# Patient Record
Sex: FEMALE | Race: WHITE | NOT HISPANIC OR LATINO | Employment: FULL TIME | URBAN - METROPOLITAN AREA
[De-identification: names, ages, dates, MRNs, and addresses within clinical notes are randomized per-mention and may not be internally consistent; named-entity substitution may affect disease eponyms.]

---

## 2018-05-11 ENCOUNTER — CONVERSION ENCOUNTER (OUTPATIENT)
Dept: MAMMOGRAPHY | Facility: HOSPITAL | Age: 48
End: 2018-05-11

## 2019-06-03 ENCOUNTER — HOSPITAL ENCOUNTER (OUTPATIENT)
Dept: LAB | Facility: HOSPITAL | Age: 49
Discharge: HOME | End: 2019-06-03
Attending: PSYCHIATRY & NEUROLOGY

## 2019-06-03 LAB
AMPHETAMINES UR QL SCN: POSITIVE
BARBITURATES UR QL SCN: NEGATIVE
BENZODIAZ UR QL SCN: NEGATIVE
CONV COCAINE, UR: NEGATIVE
METHADONE UR QL SCN: NEGATIVE
OPIATES TESTED UR SCN: NEGATIVE
OXYCODONE UR QL SCN: NEGATIVE
PCP UR QL: NEGATIVE
THC SERPLBLD CFM-MCNC: NEGATIVE NG/ML

## 2019-06-04 ENCOUNTER — HOSPITAL ENCOUNTER (OUTPATIENT)
Dept: SLEEP MEDICINE | Facility: HOSPITAL | Age: 49
Discharge: HOME | End: 2019-06-04
Attending: PSYCHIATRY & NEUROLOGY

## 2019-07-10 ENCOUNTER — OFFICE VISIT CONVERTED (OUTPATIENT)
Dept: CARDIOLOGY | Facility: CLINIC | Age: 49
End: 2019-07-10
Attending: INTERNAL MEDICINE

## 2019-10-11 ENCOUNTER — OFFICE VISIT CONVERTED (OUTPATIENT)
Dept: GASTROENTEROLOGY | Facility: CLINIC | Age: 49
End: 2019-10-11
Attending: PHYSICIAN ASSISTANT

## 2019-10-11 ENCOUNTER — CONVERSION ENCOUNTER (OUTPATIENT)
Dept: GASTROENTEROLOGY | Facility: CLINIC | Age: 49
End: 2019-10-11

## 2019-10-24 ENCOUNTER — HOSPITAL ENCOUNTER (OUTPATIENT)
Dept: GASTROENTEROLOGY | Facility: HOSPITAL | Age: 49
Setting detail: HOSPITAL OUTPATIENT SURGERY
Discharge: HOME | End: 2019-10-24
Attending: INTERNAL MEDICINE

## 2020-01-28 ENCOUNTER — HOSPITAL ENCOUNTER (OUTPATIENT)
Dept: SLEEP MEDICINE | Facility: HOSPITAL | Age: 50
Discharge: HOME | End: 2020-01-28
Attending: PSYCHIATRY & NEUROLOGY

## 2020-10-14 ENCOUNTER — HOSPITAL ENCOUNTER (OUTPATIENT)
Dept: SLEEP MEDICINE | Facility: HOSPITAL | Age: 50
Discharge: HOME | End: 2020-10-14
Attending: PSYCHIATRY & NEUROLOGY

## 2021-04-14 ENCOUNTER — HOSPITAL ENCOUNTER (OUTPATIENT)
Dept: SLEEP MEDICINE | Facility: HOSPITAL | Age: 51
Discharge: HOME | End: 2021-04-14
Attending: PSYCHIATRY & NEUROLOGY

## 2021-05-15 VITALS
HEART RATE: 69 BPM | WEIGHT: 155 LBS | SYSTOLIC BLOOD PRESSURE: 115 MMHG | RESPIRATION RATE: 16 BRPM | OXYGEN SATURATION: 100 % | HEIGHT: 66 IN | BODY MASS INDEX: 24.91 KG/M2 | DIASTOLIC BLOOD PRESSURE: 69 MMHG

## 2021-05-15 VITALS — DIASTOLIC BLOOD PRESSURE: 78 MMHG | SYSTOLIC BLOOD PRESSURE: 110 MMHG

## 2021-07-01 DIAGNOSIS — G47.11 IDIOPATHIC HYPERSOMNIA: Primary | ICD-10-CM

## 2021-07-01 RX ORDER — DEXTROAMPHETAMINE SACCHARATE, AMPHETAMINE ASPARTATE, DEXTROAMPHETAMINE SULFATE AND AMPHETAMINE SULFATE 5; 5; 5; 5 MG/1; MG/1; MG/1; MG/1
20 TABLET ORAL 3 TIMES DAILY
Qty: 90 TABLET | Refills: 0 | Status: SHIPPED | OUTPATIENT
Start: 2021-07-01 | End: 2021-08-05 | Stop reason: SDUPTHER

## 2021-08-05 DIAGNOSIS — G47.11 IDIOPATHIC HYPERSOMNIA: ICD-10-CM

## 2021-08-05 RX ORDER — DEXTROAMPHETAMINE SACCHARATE, AMPHETAMINE ASPARTATE, DEXTROAMPHETAMINE SULFATE AND AMPHETAMINE SULFATE 5; 5; 5; 5 MG/1; MG/1; MG/1; MG/1
20 TABLET ORAL 3 TIMES DAILY
Qty: 90 TABLET | Refills: 0 | Status: SHIPPED | OUTPATIENT
Start: 2021-08-05 | End: 2021-09-08 | Stop reason: SDUPTHER

## 2021-09-08 DIAGNOSIS — G47.11 IDIOPATHIC HYPERSOMNIA: ICD-10-CM

## 2021-09-08 RX ORDER — DEXTROAMPHETAMINE SACCHARATE, AMPHETAMINE ASPARTATE, DEXTROAMPHETAMINE SULFATE AND AMPHETAMINE SULFATE 5; 5; 5; 5 MG/1; MG/1; MG/1; MG/1
20 TABLET ORAL 3 TIMES DAILY
Qty: 90 TABLET | Refills: 0 | Status: SHIPPED | OUTPATIENT
Start: 2021-09-08 | End: 2021-10-12 | Stop reason: SDUPTHER

## 2021-10-12 ENCOUNTER — OFFICE VISIT (OUTPATIENT)
Dept: SLEEP MEDICINE | Facility: HOSPITAL | Age: 51
End: 2021-10-12

## 2021-10-12 VITALS
DIASTOLIC BLOOD PRESSURE: 68 MMHG | BODY MASS INDEX: 24.91 KG/M2 | HEIGHT: 66 IN | WEIGHT: 155 LBS | SYSTOLIC BLOOD PRESSURE: 120 MMHG | OXYGEN SATURATION: 100 % | HEART RATE: 83 BPM | TEMPERATURE: 96 F

## 2021-10-12 DIAGNOSIS — G47.11 IDIOPATHIC HYPERSOMNIA: ICD-10-CM

## 2021-10-12 PROCEDURE — G0463 HOSPITAL OUTPT CLINIC VISIT: HCPCS | Performed by: PSYCHIATRY & NEUROLOGY

## 2021-10-12 RX ORDER — DEXTROAMPHETAMINE SACCHARATE, AMPHETAMINE ASPARTATE, DEXTROAMPHETAMINE SULFATE AND AMPHETAMINE SULFATE 5; 5; 5; 5 MG/1; MG/1; MG/1; MG/1
20 TABLET ORAL 3 TIMES DAILY
Qty: 90 TABLET | Refills: 0 | Status: SHIPPED | OUTPATIENT
Start: 2021-10-12 | End: 2021-12-13 | Stop reason: SDUPTHER

## 2021-12-06 ENCOUNTER — HOSPITAL ENCOUNTER (OUTPATIENT)
Facility: HOSPITAL | Age: 51
Setting detail: OBSERVATION
Discharge: HOME | End: 2021-12-07
Attending: EMERGENCY MEDICINE | Admitting: STUDENT IN AN ORGANIZED HEALTH CARE EDUCATION/TRAINING PROGRAM

## 2021-12-06 ENCOUNTER — APPOINTMENT (OUTPATIENT)
Dept: GENERAL RADIOLOGY | Facility: HOSPITAL | Age: 51
End: 2021-12-06

## 2021-12-06 DIAGNOSIS — R07.9 CHEST PAIN, UNSPECIFIED TYPE: Primary | ICD-10-CM

## 2021-12-06 LAB
ALBUMIN SERPL-MCNC: 4.2 G/DL (ref 3.5–5.2)
ALBUMIN/GLOB SERPL: 1.3 G/DL
ALP SERPL-CCNC: 52 U/L (ref 39–117)
ALT SERPL W P-5'-P-CCNC: 13 U/L (ref 1–33)
ANION GAP SERPL CALCULATED.3IONS-SCNC: 8 MMOL/L (ref 5–15)
AST SERPL-CCNC: 20 U/L (ref 1–32)
BASOPHILS # BLD AUTO: 0.06 10*3/MM3 (ref 0–0.2)
BASOPHILS NFR BLD AUTO: 1.3 % (ref 0–1.5)
BILIRUB SERPL-MCNC: 0.2 MG/DL (ref 0–1.2)
BUN SERPL-MCNC: 14 MG/DL (ref 6–20)
BUN/CREAT SERPL: 19.7 (ref 7–25)
CALCIUM SPEC-SCNC: 9.5 MG/DL (ref 8.6–10.5)
CHLORIDE SERPL-SCNC: 101 MMOL/L (ref 98–107)
CK MB SERPL-CCNC: 1.74 NG/ML
CK SERPL-CCNC: 151 U/L (ref 20–180)
CO2 SERPL-SCNC: 28 MMOL/L (ref 22–29)
CREAT SERPL-MCNC: 0.71 MG/DL (ref 0.57–1)
DEPRECATED RDW RBC AUTO: 41.2 FL (ref 37–54)
EOSINOPHIL # BLD AUTO: 0.18 10*3/MM3 (ref 0–0.4)
EOSINOPHIL NFR BLD AUTO: 3.8 % (ref 0.3–6.2)
ERYTHROCYTE [DISTWIDTH] IN BLOOD BY AUTOMATED COUNT: 12.6 % (ref 12.3–15.4)
GFR SERPL CREATININE-BSD FRML MDRD: 86 ML/MIN/1.73
GLOBULIN UR ELPH-MCNC: 3.2 GM/DL
GLUCOSE SERPL-MCNC: 96 MG/DL (ref 65–99)
HCT VFR BLD AUTO: 40.5 % (ref 34–46.6)
HGB BLD-MCNC: 13.9 G/DL (ref 12–15.9)
HOLD SPECIMEN: NORMAL
IMM GRANULOCYTES # BLD AUTO: 0 10*3/MM3 (ref 0–0.05)
IMM GRANULOCYTES NFR BLD AUTO: 0 % (ref 0–0.5)
LIPASE SERPL-CCNC: 31 U/L (ref 13–60)
LYMPHOCYTES # BLD AUTO: 1.78 10*3/MM3 (ref 0.7–3.1)
LYMPHOCYTES NFR BLD AUTO: 38 % (ref 19.6–45.3)
MAGNESIUM SERPL-MCNC: 1.8 MG/DL (ref 1.6–2.6)
MCH RBC QN AUTO: 30.5 PG (ref 26.6–33)
MCHC RBC AUTO-ENTMCNC: 34.3 G/DL (ref 31.5–35.7)
MCV RBC AUTO: 89 FL (ref 79–97)
MONOCYTES # BLD AUTO: 0.44 10*3/MM3 (ref 0.1–0.9)
MONOCYTES NFR BLD AUTO: 9.4 % (ref 5–12)
NEUTROPHILS NFR BLD AUTO: 2.23 10*3/MM3 (ref 1.7–7)
NEUTROPHILS NFR BLD AUTO: 47.5 % (ref 42.7–76)
NRBC BLD AUTO-RTO: 0 /100 WBC (ref 0–0.2)
NT-PROBNP SERPL-MCNC: 51.1 PG/ML (ref 0–900)
PLATELET # BLD AUTO: 223 10*3/MM3 (ref 140–450)
PMV BLD AUTO: 11.1 FL (ref 6–12)
POTASSIUM SERPL-SCNC: 4.3 MMOL/L (ref 3.5–5.2)
PROT SERPL-MCNC: 7.4 G/DL (ref 6–8.5)
QT INTERVAL: 414 MS
QT INTERVAL: 445 MS
RBC # BLD AUTO: 4.55 10*6/MM3 (ref 3.77–5.28)
SODIUM SERPL-SCNC: 137 MMOL/L (ref 136–145)
TROPONIN I SERPL-MCNC: 0 NG/ML (ref 0–0.6)
TROPONIN I SERPL-MCNC: 0 NG/ML (ref 0–0.6)
TROPONIN T SERPL-MCNC: <0.01 NG/ML (ref 0–0.03)
TROPONIN T SERPL-MCNC: <0.01 NG/ML (ref 0–0.03)
WBC NRBC COR # BLD: 4.69 10*3/MM3 (ref 3.4–10.8)
WHOLE BLOOD HOLD SPECIMEN: NORMAL
WHOLE BLOOD HOLD SPECIMEN: NORMAL

## 2021-12-06 PROCEDURE — 82550 ASSAY OF CK (CPK): CPT

## 2021-12-06 PROCEDURE — 96372 THER/PROPH/DIAG INJ SC/IM: CPT

## 2021-12-06 PROCEDURE — 93005 ELECTROCARDIOGRAM TRACING: CPT

## 2021-12-06 PROCEDURE — 85025 COMPLETE CBC W/AUTO DIFF WBC: CPT

## 2021-12-06 PROCEDURE — 93010 ELECTROCARDIOGRAM REPORT: CPT | Performed by: INTERNAL MEDICINE

## 2021-12-06 PROCEDURE — G0378 HOSPITAL OBSERVATION PER HR: HCPCS

## 2021-12-06 PROCEDURE — 99220 PR INITIAL OBSERVATION CARE/DAY 70 MINUTES: CPT | Performed by: INTERNAL MEDICINE

## 2021-12-06 PROCEDURE — 84484 ASSAY OF TROPONIN QUANT: CPT | Performed by: INTERNAL MEDICINE

## 2021-12-06 PROCEDURE — 84484 ASSAY OF TROPONIN QUANT: CPT

## 2021-12-06 PROCEDURE — 71045 X-RAY EXAM CHEST 1 VIEW: CPT

## 2021-12-06 PROCEDURE — 83690 ASSAY OF LIPASE: CPT

## 2021-12-06 PROCEDURE — 80053 COMPREHEN METABOLIC PANEL: CPT

## 2021-12-06 PROCEDURE — 36415 COLL VENOUS BLD VENIPUNCTURE: CPT

## 2021-12-06 PROCEDURE — 83880 ASSAY OF NATRIURETIC PEPTIDE: CPT

## 2021-12-06 PROCEDURE — 82553 CREATINE MB FRACTION: CPT

## 2021-12-06 PROCEDURE — 99284 EMERGENCY DEPT VISIT MOD MDM: CPT

## 2021-12-06 PROCEDURE — 93005 ELECTROCARDIOGRAM TRACING: CPT | Performed by: EMERGENCY MEDICINE

## 2021-12-06 PROCEDURE — 93005 ELECTROCARDIOGRAM TRACING: CPT | Performed by: INTERNAL MEDICINE

## 2021-12-06 PROCEDURE — 83735 ASSAY OF MAGNESIUM: CPT

## 2021-12-06 RX ORDER — NITROGLYCERIN 0.4 MG/1
0.4 TABLET SUBLINGUAL
Status: DISCONTINUED | OUTPATIENT
Start: 2021-12-06 | End: 2021-12-07 | Stop reason: HOSPADM

## 2021-12-06 RX ORDER — MECLIZINE HYDROCHLORIDE 25 MG/1
25 TABLET ORAL 3 TIMES DAILY PRN
COMMUNITY

## 2021-12-06 RX ORDER — ESCITALOPRAM OXALATE 10 MG/1
20 TABLET ORAL DAILY
Status: DISCONTINUED | OUTPATIENT
Start: 2021-12-07 | End: 2021-12-07 | Stop reason: HOSPADM

## 2021-12-06 RX ORDER — ASPIRIN 81 MG/1
81 TABLET ORAL DAILY
Status: DISCONTINUED | OUTPATIENT
Start: 2021-12-07 | End: 2021-12-07 | Stop reason: HOSPADM

## 2021-12-06 RX ORDER — ESTRADIOL 0.03 MG/D
1 FILM, EXTENDED RELEASE TRANSDERMAL 2 TIMES WEEKLY
COMMUNITY

## 2021-12-06 RX ORDER — ESCITALOPRAM OXALATE 20 MG/1
20 TABLET ORAL DAILY
COMMUNITY

## 2021-12-06 RX ORDER — ASPIRIN 81 MG/1
324 TABLET, CHEWABLE ORAL ONCE
Status: COMPLETED | OUTPATIENT
Start: 2021-12-06 | End: 2021-12-06

## 2021-12-06 RX ORDER — ASPIRIN 81 MG/1
81 TABLET ORAL DAILY
COMMUNITY

## 2021-12-06 RX ORDER — SODIUM CHLORIDE 0.9 % (FLUSH) 0.9 %
10 SYRINGE (ML) INJECTION AS NEEDED
Status: DISCONTINUED | OUTPATIENT
Start: 2021-12-06 | End: 2021-12-07 | Stop reason: HOSPADM

## 2021-12-06 RX ORDER — SODIUM CHLORIDE 0.9 % (FLUSH) 0.9 %
10 SYRINGE (ML) INJECTION EVERY 12 HOURS SCHEDULED
Status: DISCONTINUED | OUTPATIENT
Start: 2021-12-06 | End: 2021-12-07 | Stop reason: HOSPADM

## 2021-12-06 RX ORDER — SODIUM CHLORIDE 0.9 % (FLUSH) 0.9 %
10 SYRINGE (ML) INJECTION AS NEEDED
Status: DISCONTINUED | OUTPATIENT
Start: 2021-12-06 | End: 2021-12-06

## 2021-12-06 RX ADMIN — ASPIRIN 324 MG: 81 TABLET, CHEWABLE ORAL at 14:55

## 2021-12-06 NOTE — H&P
[facility]   HOSPITALIST HISTORY AND PHYSICAL  Date: 2021   Patient Name: Terese Chun  : 1970  MRN: [id number]  Primary Care Physician:  Fabricio Vang MD  Date of admission: 2021    Subjective   Subjective     Chief Complaint:   Chest pain    HPI:    Terese Chun is a 51 y.o. female with a past medical history significant for depression, current vaping    Patient presents to the emergency department on 2021 with chest pain.  Patient describes a crushing or pressure-like sensation that is been on and off for the past 3 weeks.  Patient denies association with exertion.  Often this comes on at rest.  Patient had an episode similar to this back in 2019.  At that time she had a cardiac work-up, told it was unrevealing.  Patient was offered a cath at the time however decided not to pursue.  Patient was started on aspirin and metoprolol with improvement in her symptoms.  Patient has a strong family history for cardiac disease, currently vaping daily.  In the emergency department initial EKG and troponins were nonconcerning.  Patient will be admitted for chest pain rule out.  Emergency room physician contacted cardiology.  Hospitalist service was contacted for admission      Personal History     Past Medical History:  Depression  Current vaping    Past Surgical History:  Hysterectomy  Hemorrhoidectomy    Family History:   Several family members with heart disease    Social History:   Currently vapes daily  Minimal alcohol use  No illicit drug use    Home Medications:  amphetamine-dextroamphetamine, aspirin, escitalopram, estradiol, meclizine, and metoprolol tartrate    Allergies:  Allergies   Allergen Reactions   • Augmentin [Amoxicillin-Pot Clavulanate] Diarrhea   • Keflex [Cephalexin] Rash       Review of Systems   All systems were reviewed and negative except for: Chest pain, improved since presentation    Objective   Objective     Vitals:   Temp:  [98.3 °F (36.8 °C)]  98.3 °F (36.8 °C)  Heart Rate:  [52-71] 52  Resp:  [18] 18  BP: ()/(58-73) 102/66    Physical Exam    Constitutional: Awake, alert, no acute distress   Eyes: Pupils equal, sclerae anicteric, no conjunctival injection   HENT: NCAT, mucous membranes moist   Neck: Supple, no thyromegaly, no lymphadenopathy, trachea midline   Respiratory: Clear to auscultation bilaterally, nonlabored respirations    Cardiovascular: RRR, no murmurs, rubs, or gallops, palpable pedal pulses bilaterally   Gastrointestinal: Positive bowel sounds, soft, nontender, nondistended   Musculoskeletal: No bilateral ankle edema, no clubbing or cyanosis to extremities   Psychiatric: Appropriate affect, cooperative   Neurologic: Oriented x 3, strength symmetric in all extremities, Cranial Nerves grossly intact to confrontation, speech clear   Skin: No rashes     Result Review    Result Review:  I have personally reviewed the results from the time of this admission to 12/6/2021 17:04 EST and agree with these findings:  [x]  Laboratory  []  Microbiology  [x]  Radiology  [x]  EKG/Telemetry   [x]  Cardiology/Vascular   []  Pathology  [x]  Old records  []  Other:      Assessment/Plan   Assessment / Plan     Assessment/Plan:   Chest pain rule out  Depression  Ongoing use of vaping    Plan:  Patient will be admitted to the hospital under observation status   Cardiology has been consulted, appreciate assistance  Patient has been loaded with aspirin in the ED  Continue with daily aspirin  Continue to trend troponins and EKGs  Ordering patient's home metoprolol  Continuing her Lexapro  Starting patient on a cardiac diet  Nitro ordered for any additional chest pain      DVT prophylaxis:  Medical DVT prophylaxis orders are present.    CODE STATUS:    Code Status (Patient has no pulse and is not breathing): CPR (Attempt to Resuscitate)  Medical Interventions (Patient has pulse or is breathing): Full Support      Admission Status:  I believe this patient meets  observation status.    Electronically signed by Korey Solomon MD, 12/06/21, 3:13 PM EST.    WBC   Date Value Ref Range Status   12/06/2021 4.69 3.40 - 10.80 10*3/mm3 Final     RBC   Date Value Ref Range Status   12/06/2021 4.55 3.77 - 5.28 10*6/mm3 Final     Hemoglobin   Date Value Ref Range Status   12/06/2021 13.9 12.0 - 15.9 g/dL Final     Hematocrit   Date Value Ref Range Status   12/06/2021 40.5 34.0 - 46.6 % Final     MCV   Date Value Ref Range Status   12/06/2021 89.0 79.0 - 97.0 fL Final     MCH   Date Value Ref Range Status   12/06/2021 30.5 26.6 - 33.0 pg Final     MCHC   Date Value Ref Range Status   12/06/2021 34.3 31.5 - 35.7 g/dL Final     RDW   Date Value Ref Range Status   12/06/2021 12.6 12.3 - 15.4 % Final     RDW-SD   Date Value Ref Range Status   12/06/2021 41.2 37.0 - 54.0 fl Final     MPV   Date Value Ref Range Status   12/06/2021 11.1 6.0 - 12.0 fL Final     Platelets   Date Value Ref Range Status   12/06/2021 223 140 - 450 10*3/mm3 Final     Neutrophil %   Date Value Ref Range Status   12/06/2021 47.5 42.7 - 76.0 % Final     Lymphocyte %   Date Value Ref Range Status   12/06/2021 38.0 19.6 - 45.3 % Final     Monocyte %   Date Value Ref Range Status   12/06/2021 9.4 5.0 - 12.0 % Final     Eosinophil %   Date Value Ref Range Status   12/06/2021 3.8 0.3 - 6.2 % Final     Basophil %   Date Value Ref Range Status   12/06/2021 1.3 0.0 - 1.5 % Final     Immature Grans %   Date Value Ref Range Status   12/06/2021 0.0 0.0 - 0.5 % Final     Neutrophils, Absolute   Date Value Ref Range Status   12/06/2021 2.23 1.70 - 7.00 10*3/mm3 Final     Lymphocytes, Absolute   Date Value Ref Range Status   12/06/2021 1.78 0.70 - 3.10 10*3/mm3 Final     Monocytes, Absolute   Date Value Ref Range Status   12/06/2021 0.44 0.10 - 0.90 10*3/mm3 Final     Eosinophils, Absolute   Date Value Ref Range Status   12/06/2021 0.18 0.00 - 0.40 10*3/mm3 Final     Basophils, Absolute   Date Value Ref Range Status   12/06/2021 0.06  0.00 - 0.20 10*3/mm3 Final     Immature Grans, Absolute   Date Value Ref Range Status   12/06/2021 0.00 0.00 - 0.05 10*3/mm3 Final     nRBC   Date Value Ref Range Status   12/06/2021 0.0 0.0 - 0.2 /100 WBC Final       Lab Results   Component Value Date    GLUCOSE 96 12/06/2021    BUN 14 12/06/2021    CREATININE 0.71 12/06/2021    EGFRIFNONA 86 12/06/2021    BCR 19.7 12/06/2021    K 4.3 12/06/2021    CO2 28.0 12/06/2021    CALCIUM 9.5 12/06/2021    ALBUMIN 4.20 12/06/2021    LABIL2 1.3 (L) 07/02/2019    AST 20 12/06/2021    ALT 13 12/06/2021       XR Chest 1 View    Result Date: 12/6/2021  PROCEDURE: XR CHEST 1 VW  COMPARISON: [facility], CR, CHEST AP/PA 1 VIEW, 7/02/2019, 13:41.  INDICATIONS: Chest Pain triage protocol  FINDINGS:  There is no pneumothorax, pleural effusion or focal airspace consolidation. The heart size and pulmonary vasculature appear within normal limits. There are no acute osseous abnormalities.  CONCLUSION: No acute cardiopulmonary abnormality.       ASHLEY WILLSON MD       Electronically Signed and Approved By: ASHLEY WILLSON MD on 12/06/2021 at 11:29

## 2021-12-06 NOTE — ED PROVIDER NOTES
Time: 12:18 PM EST  Arrived by: private car  Chief Complaint: chest pain  History provided by: patient  History is limited by: N/A     History of Present Illness:  Patient is a 51 y.o. year old female that presents to the emergency department with chest pain described as pressure with radiation to the left arm.  The patient reports that she had this episode 3 weeks ago.  She states that it came back again yesterday and once again today.  She reports that the chest pain has been at rest.  She denies exacerbation through exertion.  Patient denies shortness of breath.  Patient has no cough hemoptysis.  Patient reports nausea with no emesis.  Patient denies leg pain and swelling.  Patient denies abdominal pain.      Chest Pain  Pain location:  L chest  Pain quality: pressure    Pain radiates to:  L arm  Pain severity:  No pain  Onset quality:  Sudden  Duration:  3 weeks  Timing:  Constant  Progression:  Worsening  Relieved by:  Nothing  Worsened by:  Nothing  Associated symptoms: no abdominal pain, no cough, no fever, no headache, no nausea, no palpitations, no shortness of breath and no vomiting        Similar Symptoms Previously: yes  Recently seen: no      Patient Care Team  Primary Care Provider: Fabricio Vang MD    Past Medical History:     Allergies   Allergen Reactions   • Augmentin [Amoxicillin-Pot Clavulanate] Diarrhea   • Keflex [Cephalexin] Rash     No past medical history on file.  No past surgical history on file.  No family history on file.    Home Medications:  Prior to Admission medications    Medication Sig Start Date End Date Taking? Authorizing Provider   amphetamine-dextroamphetamine (Adderall) 20 MG tablet Take 1 tablet by mouth 3 (Three) Times a Day. 10/12/21   Shari Mathur MD        Social History:   Social History     Tobacco Use   • Smoking status: Not on file   • Smokeless tobacco: Not on file   Substance Use Topics   • Alcohol use: Not on file   • Drug use: Not on file     Recent  "travel: no     Review of Systems:  Review of Systems   Constitutional: Negative for chills and fever.   HENT: Negative for congestion, rhinorrhea and sore throat.    Eyes: Negative for pain and visual disturbance.   Respiratory: Negative for apnea, cough, chest tightness and shortness of breath.    Cardiovascular: Negative for chest pain and palpitations.   Gastrointestinal: Negative for abdominal pain, diarrhea, nausea and vomiting.   Genitourinary: Negative for difficulty urinating and dysuria.   Musculoskeletal: Negative for joint swelling and myalgias.   Skin: Negative for color change.   Neurological: Negative for seizures and headaches.   Psychiatric/Behavioral: Negative.    All other systems reviewed and are negative.       Physical Exam:  BP 99/68   Pulse 55   Temp 98.3 °F (36.8 °C) (Oral)   Resp 18   Ht 165.1 cm (65\")   Wt 69.4 kg (153 lb)   SpO2 98%   BMI 25.46 kg/m²     Physical Exam  Vitals and nursing note reviewed.   Constitutional:       General: She is not in acute distress.     Appearance: Normal appearance. She is not toxic-appearing.   HENT:      Head: Normocephalic and atraumatic.      Jaw: There is normal jaw occlusion.   Eyes:      General: Lids are normal.      Extraocular Movements: Extraocular movements intact.      Conjunctiva/sclera: Conjunctivae normal.      Pupils: Pupils are equal, round, and reactive to light.   Cardiovascular:      Rate and Rhythm: Normal rate and regular rhythm.      Pulses: Normal pulses.      Heart sounds: Normal heart sounds.   Pulmonary:      Effort: Pulmonary effort is normal. No respiratory distress.      Breath sounds: Normal breath sounds. No wheezing or rhonchi.   Abdominal:      General: Abdomen is flat.      Palpations: Abdomen is soft.      Tenderness: There is no abdominal tenderness. There is no guarding or rebound.   Musculoskeletal:         General: Normal range of motion.      Cervical back: Normal range of motion and neck supple.      Right " lower leg: No edema.      Left lower leg: No edema.   Skin:     General: Skin is warm and dry.   Neurological:      Mental Status: She is alert and oriented to person, place, and time. Mental status is at baseline.   Psychiatric:         Mood and Affect: Mood normal.                Medications in the Emergency Department:  Medications   sodium chloride 0.9 % flush 10 mL (has no administration in time range)   aspirin chewable tablet 324 mg (324 mg Oral Given 12/6/21 1455)        Labs  Lab Results (last 24 hours)     Procedure Component Value Units Date/Time    POC Troponin I with Hold Tube [[id number]] Collected: 12/06/21 1055    Specimen: Blood Updated: 12/06/21 1126    Narrative:      The following orders were created for panel order POC Troponin I with Hold Tube.  Procedure                               Abnormality         Status                     ---------                               -----------         ------                     POC Troponin I[[id number]]                                                              HOLD Troponin-I Tube[[id number]]                             Final result                 Please view results for these tests on the individual orders.    CBC & Differential [[id number]]  (Normal) Collected: 12/06/21 1055    Specimen: Blood Updated: 12/06/21 1112    Narrative:      The following orders were created for panel order CBC & Differential.  Procedure                               Abnormality         Status                     ---------                               -----------         ------                     CBC Auto Differential[[id number]]        Normal              Final result                 Please view results for these tests on the individual orders.    Comprehensive Metabolic Panel [[id number]] Collected: 12/06/21 1055    Specimen: Blood Updated: 12/06/21 1138     Glucose 96 mg/dL      BUN 14 mg/dL      Creatinine 0.71 mg/dL      Sodium 137 mmol/L      Potassium 4.3 mmol/L       Chloride 101 mmol/L      CO2 28.0 mmol/L      Calcium 9.5 mg/dL      Total Protein 7.4 g/dL      Albumin 4.20 g/dL      ALT (SGPT) 13 U/L      AST (SGOT) 20 U/L      Alkaline Phosphatase 52 U/L      Total Bilirubin 0.2 mg/dL      eGFR Non African Amer 86 mL/min/1.73      Globulin 3.2 gm/dL      A/G Ratio 1.3 g/dL      BUN/Creatinine Ratio 19.7     Anion Gap 8.0 mmol/L     Narrative:      GFR Normal >60  Chronic Kidney Disease <60  Kidney Failure <15      Lipase [[id number]]  (Normal) Collected: 12/06/21 1055    Specimen: Blood Updated: 12/06/21 1138     Lipase 31 U/L     BNP [[id number]]  (Normal) Collected: 12/06/21 1055    Specimen: Blood Updated: 12/06/21 1133     proBNP 51.1 pg/mL     Narrative:      Among patients with dyspnea, NT-proBNP is highly sensitive for the detection of acute congestive heart failure. In addition NT-proBNP of <300 pg/ml effectively rules out acute congestive heart failure with 99% negative predictive value.    Results may be falsely decreased if patient taking Biotin.      Magnesium [[id number]]  (Normal) Collected: 12/06/21 1055    Specimen: Blood Updated: 12/06/21 1138     Magnesium 1.8 mg/dL     CK Total & CKMB [[id number]]  (Normal) Collected: 12/06/21 1055    Specimen: Blood Updated: 12/06/21 1138     CKMB 1.74 ng/mL      Creatine Kinase 151 U/L     Narrative:      CKMB results may be falsely decreased if patient taking Biotin.    CBC Auto Differential [[id number]]  (Normal) Collected: 12/06/21 1055    Specimen: Blood Updated: 12/06/21 1112     WBC 4.69 10*3/mm3      RBC 4.55 10*6/mm3      Hemoglobin 13.9 g/dL      Hematocrit 40.5 %      MCV 89.0 fL      MCH 30.5 pg      MCHC 34.3 g/dL      RDW 12.6 %      RDW-SD 41.2 fl      MPV 11.1 fL      Platelets 223 10*3/mm3      Neutrophil % 47.5 %      Lymphocyte % 38.0 %      Monocyte % 9.4 %      Eosinophil % 3.8 %      Basophil % 1.3 %      Immature Grans % 0.0 %      Neutrophils, Absolute 2.23 10*3/mm3      Lymphocytes, Absolute 1.78  10*3/mm3      Monocytes, Absolute 0.44 10*3/mm3      Eosinophils, Absolute 0.18 10*3/mm3      Basophils, Absolute 0.06 10*3/mm3      Immature Grans, Absolute 0.00 10*3/mm3      nRBC 0.0 /100 WBC     POC Troponin I [[id number]]  (Normal) Collected: 12/06/21 1102    Specimen: Blood Updated: 12/06/21 1126     Troponin I 0.00 ng/mL      Comment: Serial Number: [id number]:  [id number]              Imaging:  XR Chest 1 View    Result Date: 12/6/2021  PROCEDURE: XR CHEST 1 VW  COMPARISON: [facility], CR, CHEST AP/PA 1 VIEW, 7/02/2019, 13:41.  INDICATIONS: Chest Pain triage protocol  FINDINGS:  There is no pneumothorax, pleural effusion or focal airspace consolidation. The heart size and pulmonary vasculature appear within normal limits. There are no acute osseous abnormalities.  CONCLUSION: No acute cardiopulmonary abnormality.       ASHLEY WILLSON MD       Electronically Signed and Approved By: ASHLEY WILLSON MD on 12/06/2021 at 11:29               Procedures:  Procedures    Progress     EKG:    Rhythm: sinus  Rate: 65  Axis: normal   Intervals: RBBB  ST Segment: no elevations    EKG Comparison: unchanged    Interpreted by me                         Medical Decision Making:  MDM  Number of Diagnoses or Management Options  Chest pain, unspecified type  Diagnosis management comments: The patient´s CBC was reviewed and shows no abnormalities of critical concern. Of note, there is no anemia requiring a blood transfusion and the platelet count is acceptable.  The patient´s CMP was reviewed and shows no abnormalities of critical concern. Of note, the patient´s sodium and potassium are acceptable. The patient´s liver enzymes are unremarkable. The patient´s renal function (creatinine) is preserved. The patient has a normal anion gap.  Magnesium is 1.8.  Lipase is 31.  Troponin is negative.  Chest x-ray is negative for pneumonia.  Patient has a concerning story for chest pain.  Case was discussed with Dr. Griffith who  agrees to consult.  Patient admitted under the care of Dr. Taveras.       Amount and/or Complexity of Data Reviewed  Clinical lab tests: reviewed  Tests in the radiology section of CPT®: reviewed  Tests in the medicine section of CPT®: reviewed  Discussion of test results with the performing providers: yes  Discuss the patient with other providers: yes  Independent visualization of images, tracings, or specimens: yes    Risk of Complications, Morbidity, and/or Mortality  Presenting problems: moderate  Management options: moderate         Final diagnoses:   Chest pain, unspecified type        Disposition:  ED Disposition     ED Disposition Condition Comment    Decision to Admit                   Abi Beasley MD  12/06/21 [id number]

## 2021-12-07 ENCOUNTER — APPOINTMENT (OUTPATIENT)
Dept: NUCLEAR MEDICINE | Facility: HOSPITAL | Age: 51
End: 2021-12-07

## 2021-12-07 VITALS
OXYGEN SATURATION: 95 % | TEMPERATURE: 97.2 F | SYSTOLIC BLOOD PRESSURE: 89 MMHG | WEIGHT: 152.78 LBS | HEIGHT: 65 IN | BODY MASS INDEX: 25.45 KG/M2 | RESPIRATION RATE: 16 BRPM | DIASTOLIC BLOOD PRESSURE: 41 MMHG | HEART RATE: 67 BPM

## 2021-12-07 PROBLEM — Z82.49 FAMILY HISTORY OF EARLY CAD: Status: ACTIVE | Noted: 2021-12-07

## 2021-12-07 PROBLEM — F17.219 CIGARETTE NICOTINE DEPENDENCE WITH NICOTINE-INDUCED DISORDER: Status: ACTIVE | Noted: 2021-12-07

## 2021-12-07 LAB
ANION GAP SERPL CALCULATED.3IONS-SCNC: 8.7 MMOL/L (ref 5–15)
BH CV IMMEDIATE POST RECOVERY TECH DATA SYMPTOMS: NORMAL
BH CV IMMEDIATE POST TECH DATA BLOOD PRESSURE: NORMAL MMHG
BH CV IMMEDIATE POST TECH DATA HEART RATE: 110 BPM
BH CV IMMEDIATE POST TECH DATA OXYGEN SATS: 98 %
BH CV REST NUCLEAR ISOTOPE DOSE: 8.6 MCI
BH CV SIX MINUTE RECOVERY TECH DATA BLOOD PRESSURE: NORMAL
BH CV SIX MINUTE RECOVERY TECH DATA HEART RATE: 86 BPM
BH CV SIX MINUTE RECOVERY TECH DATA OXYGEN SATURATION: 97 %
BH CV STRESS BP STAGE 1: NORMAL
BH CV STRESS BP STAGE 2: NORMAL
BH CV STRESS BP STAGE 3: NORMAL
BH CV STRESS DURATION MIN STAGE 1: 3
BH CV STRESS DURATION MIN STAGE 2: 3
BH CV STRESS DURATION MIN STAGE 3: 3
BH CV STRESS DURATION SEC STAGE 1: 0
BH CV STRESS DURATION SEC STAGE 2: 0
BH CV STRESS DURATION SEC STAGE 3: 0
BH CV STRESS GRADE STAGE 1: 10
BH CV STRESS GRADE STAGE 2: 12
BH CV STRESS GRADE STAGE 3: 14
BH CV STRESS HR STAGE 1: 102
BH CV STRESS HR STAGE 2: 115
BH CV STRESS HR STAGE 3: 150
BH CV STRESS METS STAGE 1: 5
BH CV STRESS METS STAGE 2: 7.5
BH CV STRESS METS STAGE 3: 10
BH CV STRESS NUCLEAR ISOTOPE DOSE: 34.3 MCI
BH CV STRESS O2 STAGE 1: 95
BH CV STRESS O2 STAGE 2: 96
BH CV STRESS O2 STAGE 3: 96
BH CV STRESS PROTOCOL 1: NORMAL
BH CV STRESS RECOVERY BP: NORMAL MMHG
BH CV STRESS RECOVERY HR: 86 BPM
BH CV STRESS RECOVERY O2: 98 %
BH CV STRESS SPEED STAGE 1: 1.7
BH CV STRESS SPEED STAGE 2: 2.5
BH CV STRESS SPEED STAGE 3: 3.4
BH CV STRESS STAGE 1: 1
BH CV STRESS STAGE 2: 2
BH CV STRESS STAGE 3: 3
BH CV THREE MINUTE POST TECH DATA BLOOD PRESSURE: NORMAL MMHG
BH CV THREE MINUTE POST TECH DATA HEART RATE: 92 BPM
BH CV THREE MINUTE POST TECH DATA OXYGEN SATURATION: 98 %
BUN SERPL-MCNC: 16 MG/DL (ref 6–20)
BUN/CREAT SERPL: 17.6 (ref 7–25)
CALCIUM SPEC-SCNC: 9 MG/DL (ref 8.6–10.5)
CHLORIDE SERPL-SCNC: 102 MMOL/L (ref 98–107)
CHOLEST SERPL-MCNC: 202 MG/DL (ref 0–200)
CO2 SERPL-SCNC: 26.3 MMOL/L (ref 22–29)
CREAT SERPL-MCNC: 0.91 MG/DL (ref 0.57–1)
DEPRECATED RDW RBC AUTO: 41.4 FL (ref 37–54)
ERYTHROCYTE [DISTWIDTH] IN BLOOD BY AUTOMATED COUNT: 12.6 % (ref 12.3–15.4)
GFR SERPL CREATININE-BSD FRML MDRD: 65 ML/MIN/1.73
GLUCOSE SERPL-MCNC: 100 MG/DL (ref 65–99)
HBA1C MFR BLD: 5.2 % (ref 4.8–5.6)
HCT VFR BLD AUTO: 39.7 % (ref 34–46.6)
HDLC SERPL-MCNC: 44 MG/DL (ref 40–60)
HGB BLD-MCNC: 13.4 G/DL (ref 12–15.9)
LDLC SERPL CALC-MCNC: 139 MG/DL (ref 0–100)
LDLC/HDLC SERPL: 3.11 {RATIO}
LV EF NUC BP: 68 %
MAGNESIUM SERPL-MCNC: 1.8 MG/DL (ref 1.6–2.6)
MAXIMAL PREDICTED HEART RATE: 167 BPM
MCH RBC QN AUTO: 30.5 PG (ref 26.6–33)
MCHC RBC AUTO-ENTMCNC: 33.8 G/DL (ref 31.5–35.7)
MCV RBC AUTO: 90.2 FL (ref 79–97)
PERCENT MAX PREDICTED HR: 89.82 %
PLATELET # BLD AUTO: 188 10*3/MM3 (ref 140–450)
PMV BLD AUTO: 11.5 FL (ref 6–12)
POTASSIUM SERPL-SCNC: 4.3 MMOL/L (ref 3.5–5.2)
QT INTERVAL: 426 MS
QT INTERVAL: 431 MS
QT INTERVAL: 474 MS
RBC # BLD AUTO: 4.4 10*6/MM3 (ref 3.77–5.28)
SODIUM SERPL-SCNC: 137 MMOL/L (ref 136–145)
STRESS BASELINE BP: NORMAL MMHG
STRESS BASELINE HR: 61 BPM
STRESS O2 SAT REST: 97 %
STRESS PERCENT HR: 106 %
STRESS POST ESTIMATED WORKLOAD: 10.6 METS
STRESS POST EXERCISE DUR MIN: 9 MIN
STRESS POST EXERCISE DUR SEC: 59 SEC
STRESS POST O2 SAT PEAK: 96 %
STRESS POST PEAK BP: NORMAL MMHG
STRESS POST PEAK HR: 150 BPM
STRESS TARGET HR: 142 BPM
TRIGL SERPL-MCNC: 106 MG/DL (ref 0–150)
TROPONIN T SERPL-MCNC: <0.01 NG/ML (ref 0–0.03)
VLDLC SERPL-MCNC: 19 MG/DL (ref 5–40)
WBC NRBC COR # BLD: 5.91 10*3/MM3 (ref 3.4–10.8)

## 2021-12-07 PROCEDURE — 80048 BASIC METABOLIC PNL TOTAL CA: CPT | Performed by: INTERNAL MEDICINE

## 2021-12-07 PROCEDURE — G0378 HOSPITAL OBSERVATION PER HR: HCPCS

## 2021-12-07 PROCEDURE — 85027 COMPLETE CBC AUTOMATED: CPT | Performed by: INTERNAL MEDICINE

## 2021-12-07 PROCEDURE — 83036 HEMOGLOBIN GLYCOSYLATED A1C: CPT | Performed by: INTERNAL MEDICINE

## 2021-12-07 PROCEDURE — 78452 HT MUSCLE IMAGE SPECT MULT: CPT | Performed by: INTERNAL MEDICINE

## 2021-12-07 PROCEDURE — 99214 OFFICE O/P EST MOD 30 MIN: CPT | Performed by: INTERNAL MEDICINE

## 2021-12-07 PROCEDURE — 93005 ELECTROCARDIOGRAM TRACING: CPT | Performed by: INTERNAL MEDICINE

## 2021-12-07 PROCEDURE — 84484 ASSAY OF TROPONIN QUANT: CPT | Performed by: INTERNAL MEDICINE

## 2021-12-07 PROCEDURE — 93018 CV STRESS TEST I&R ONLY: CPT | Performed by: INTERNAL MEDICINE

## 2021-12-07 PROCEDURE — 83735 ASSAY OF MAGNESIUM: CPT | Performed by: INTERNAL MEDICINE

## 2021-12-07 PROCEDURE — A9502 TC99M TETROFOSMIN: HCPCS | Performed by: STUDENT IN AN ORGANIZED HEALTH CARE EDUCATION/TRAINING PROGRAM

## 2021-12-07 PROCEDURE — 0 TECHNETIUM TETROFOSMIN KIT: Performed by: STUDENT IN AN ORGANIZED HEALTH CARE EDUCATION/TRAINING PROGRAM

## 2021-12-07 PROCEDURE — 80061 LIPID PANEL: CPT | Performed by: INTERNAL MEDICINE

## 2021-12-07 PROCEDURE — 99217 PR OBSERVATION CARE DISCHARGE MANAGEMENT: CPT | Performed by: STUDENT IN AN ORGANIZED HEALTH CARE EDUCATION/TRAINING PROGRAM

## 2021-12-07 PROCEDURE — 93017 CV STRESS TEST TRACING ONLY: CPT

## 2021-12-07 PROCEDURE — 25010000002 ENOXAPARIN PER 10 MG: Performed by: INTERNAL MEDICINE

## 2021-12-07 PROCEDURE — 93010 ELECTROCARDIOGRAM REPORT: CPT | Performed by: INTERNAL MEDICINE

## 2021-12-07 PROCEDURE — 78452 HT MUSCLE IMAGE SPECT MULT: CPT

## 2021-12-07 PROCEDURE — 93016 CV STRESS TEST SUPVJ ONLY: CPT | Performed by: NURSE PRACTITIONER

## 2021-12-07 RX ORDER — METOPROLOL SUCCINATE 25 MG/1
25 TABLET, EXTENDED RELEASE ORAL NIGHTLY
Qty: 30 TABLET | Refills: 0 | Status: SHIPPED | OUTPATIENT
Start: 2021-12-07 | End: 2022-01-17 | Stop reason: SDUPTHER

## 2021-12-07 RX ORDER — NITROGLYCERIN 40 MG/1
1 PATCH TRANSDERMAL DAILY
Qty: 30 PATCH | Refills: 0 | Status: SHIPPED | OUTPATIENT
Start: 2021-12-08 | End: 2022-01-17 | Stop reason: SINTOL

## 2021-12-07 RX ORDER — METOPROLOL SUCCINATE 25 MG/1
25 TABLET, EXTENDED RELEASE ORAL NIGHTLY
Status: DISCONTINUED | OUTPATIENT
Start: 2021-12-07 | End: 2021-12-07 | Stop reason: HOSPADM

## 2021-12-07 RX ORDER — NITROGLYCERIN 40 MG/1
1 PATCH TRANSDERMAL DAILY
Status: DISCONTINUED | OUTPATIENT
Start: 2021-12-07 | End: 2021-12-07 | Stop reason: HOSPADM

## 2021-12-07 RX ADMIN — ASPIRIN 81 MG: 81 TABLET, COATED ORAL at 09:17

## 2021-12-07 RX ADMIN — TETROFOSMIN 1 DOSE: 1.38 INJECTION, POWDER, LYOPHILIZED, FOR SOLUTION INTRAVENOUS at 09:31

## 2021-12-07 RX ADMIN — ESCITALOPRAM OXALATE 20 MG: 10 TABLET ORAL at 14:35

## 2021-12-07 RX ADMIN — NITROGLYCERIN 1 PATCH: 0.2 PATCH TRANSDERMAL at 15:34

## 2021-12-07 RX ADMIN — ENOXAPARIN SODIUM 40 MG: 40 INJECTION SUBCUTANEOUS at 09:17

## 2021-12-07 RX ADMIN — TETROFOSMIN 1 DOSE: 1.38 INJECTION, POWDER, LYOPHILIZED, FOR SOLUTION INTRAVENOUS at 10:55

## 2021-12-07 RX ADMIN — SODIUM CHLORIDE, PRESERVATIVE FREE 10 ML: 5 INJECTION INTRAVENOUS at 09:17

## 2021-12-07 NOTE — PLAN OF CARE
Goal Outcome Evaluation:  Plan of Care Reviewed With: patient        Progress: improving  Outcome Summary: Patient had a stress test today that was negative. Started on nitro patch. Patient is getting D/C'ed home with follow up appts.

## 2021-12-07 NOTE — PROGRESS NOTES
[facility]   Hospitalist Progress Note  Date: 2021  Patient Name: Terese Chun  : 1970  MRN: [id number]  Date of admission: 2021      Subjective   Subjective     Chief Complaint: Chest pain    Summary: 51-year-old female presented with chief complaint of chest pain subsequently being evaluated for cardiac ischemia.  Cardiology is following and patient is undergoing nuclear stress test today.    Interval Followup: No events overnight.  Patient currently n.p.o. for nuclear stress test today.  She does complain of intermittent chest pain.  She did not require any nitroglycerin.  She is saturating well on room air.  Patient's blood pressure has been soft, however this appears to be chronic for her.  She is currently asymptomatic.  She denies any nausea, episodes emesis, fever, chills.    Review of Systems  All systems reviewed and are negative except as above in HPI.    Objective   Objective     Vitals:   Temp:  [97.7 °F (36.5 °C)-98.3 °F (36.8 °C)] 97.7 °F (36.5 °C)  Heart Rate:  [52-76] 56  Resp:  [16-18] 16  BP: ()/(43-89) 96/44  Physical Exam    Constitutional: Awake, alert, no acute distress   Eyes: Pupils equal, sclerae anicteric, no conjunctival injection   HENT: NCAT, mucous membranes moist   Neck: Supple, no thyromegaly, no lymphadenopathy, trachea midline   Respiratory: Clear to auscultation bilaterally, nonlabored respirations    Cardiovascular: RRR, no murmurs, rubs, or gallops, palpable pedal pulses bilaterally   Gastrointestinal: Positive bowel sounds, soft, nontender, nondistended   Musculoskeletal: No bilateral ankle edema, no clubbing or cyanosis to extremities   Psychiatric: Appropriate affect, cooperative   Neurologic: Answers questions appropriately, follows commands, no focal deficits   Skin: No rashes     Result Review    Result Review:  I have personally reviewed the results from the time of this admission to 2021 08:51 EST and agree with these  findings:  [x]  Laboratory  [x]  Microbiology  [x]  Radiology  [x]  EKG/Telemetry   []  Cardiology/Vascular   []  Pathology  [x]  Old records  []  Other:    Assessment/Plan   Assessment / Plan     Assessment:  Atypical chest pain concerning for underlying cardiac ischemia  Depression  History of vape use  Positive family history of early onset coronary artery disease  Current nicotine dependence    Plan:  Cardiology assistance appreciated this patient, plan for nuclear stress test  Serial cardiac enzymes negative  Continue to monitor on telemetry  Continue keep n.p.o., okay to restart diet post procedure  Continue to  on the importance of tobacco cessation going forward  Continue Lexapro, currently no mood disturbances  Lipid panel, A1c reviewed  Lovenox for DVT prophylaxis  Labs reviewed, records reviewed, image reviewed, vital signs reviewed, medications reviewed  Further recommendations pending clinical course    Discussed plan with RN.    DVT prophylaxis:  Medical DVT prophylaxis orders are present.    CODE STATUS:   Code Status (Patient has no pulse and is not breathing): CPR (Attempt to Resuscitate)  Medical Interventions (Patient has pulse or is breathing): Full Support        Electronically signed by Fabricio Tidwell DO, 12/07/21, 8:51 AM EST.

## 2021-12-07 NOTE — CASE MANAGEMENT/SOCIAL WORK
Discharge Planning Assessment   Greg     Patient Name: Terese Chun  MRN: [id number]  Today's Date: 12/7/2021    Admit Date: 12/6/2021     Discharge Needs Assessment     Row Name 12/07/21 1506       Living Environment    Primary Care Provided by self    Family Caregiver if Needed none    Quality of Family Relationships involved; helpful; supportive    Able to Return to Prior Arrangements yes       Resource/Environmental Concerns    Resource/Environmental Concerns none    Transportation Concerns car, none       Transition Planning    Patient/Family Anticipates Transition to home with family    Patient/Family Anticipated Services at Transition none    Transportation Anticipated car, drives self       Discharge Needs Assessment    Readmission Within the Last 30 Days no previous admission in last 30 days    Equipment Currently Used at Home none    Concerns to be Addressed no discharge needs identified; denies needs/concerns at this time    Anticipated Changes Related to Illness none    Equipment Needed After Discharge none               Discharge Plan     Row Name 12/07/21 1507       Plan    Plan Pt to follow up with Dr Vasquez with in 2 weeks. Pt will need a work note upon d/c.    Final Discharge Disposition Code 01 - home or self-care              Continued Care and Services - Admitted Since 12/6/2021    Coordination has not been started for this encounter.          Demographic Summary     Row Name 12/07/21 1505       General Information    Admission Type inpatient    Arrived From emergency department    Referral Source admission list    Preferred Language English     Used During This Interaction no               Functional Status     Row Name 12/07/21 1505       Functional Status    Usual Activity Tolerance excellent    Current Activity Tolerance excellent       Functional Status, IADL    Medications independent    Meal Preparation independent    Housekeeping independent    Laundry independent     Shopping independent       Mental Status    General Appearance WDL WDL       Mental Status Summary    Recent Changes in Mental Status/Cognitive Functioning no changes       Employment/    Employment Status employed full-time    Shift Worked first shift    Current or Previous Occupation healthcare    Employment/ Comments SS at [employer]               Psychosocial    No documentation.                Abuse/Neglect    No documentation.                Legal     Row Name 12/07/21 1506       Financial/Legal    Source of Income salary/wages       Legal    Criminal Activity/Legal Involvement none               Substance Abuse    No documentation.                Patient Forms    No documentation.                   Vera Gilliland RN

## 2021-12-07 NOTE — PLAN OF CARE
Goal Outcome Evaluation:      No change in patient status this shift. BP remains soft, which patient states is baseline. Continues to have intermittent chest pain, 5/10 at worst, described as pressure that lasts 2-3 minutes, resolves on own. NPO since midnight. Continue to monitor.

## 2021-12-07 NOTE — CONSULTS
[facility]   CONSULTATION  Patient Name: Terese Chun  : 1970  MRN: [id number]  Primary Care Physician:  Fabricio Vang MD  Date of admission: 2021    Subjective   Subjective     Chief Complaint: Chest pain    History of Present Illness: Ms. Chun is a 51 years old female who came in with chest pain that had been going well for the last few days.  The pain is left-sided sharp in character last 1 or 2 minutes and sometimes can be rare for many hours.  Its not severe moderate intensity does not cause her to be short of breath or diaphoretic.  It gets worse with stress.  She has had this pain in the past and had a treadmill stress test few years ago she was negative for ischemia.  However she has very strong family history of premature atherosclerosis and hence she is being admitted to rule out MI and do the appropriate studies.    Review of Systems   Constitutional: Negative for fatigue and fever.   HENT: Negative for hearing loss and trouble swallowing.    Eyes: Negative for visual disturbance.   Respiratory: Positive for chest tightness. Negative for cough, shortness of breath and wheezing.    Cardiovascular: Positive for chest pain. Negative for palpitations and leg swelling.   Gastrointestinal: Negative for abdominal distention, abdominal pain, nausea and vomiting.   Genitourinary: Negative for dysuria.   Musculoskeletal: Negative for myalgias.   Skin: Negative for rash.   Neurological: Negative for dizziness, syncope, light-headedness and headaches.      .negative for PND and negative for orthopnea.    Personal History       Past Medical History:   Diagnosis Date   • Hypertension        Past Surgical History:   Procedure Laterality Date   • HYSTERECTOMY         Family History: Strongly positive for premature atherosclerosis    Social History:  reports that she has quit smoking. Her smoking use included cigarettes. She has never used smokeless tobacco. She reports current alcohol  use. She reports that she does not use drugs.    Home Medications:  amphetamine-dextroamphetamine, aspirin, escitalopram, estradiol, meclizine, and metoprolol tartrate    Allergies:  Allergies   Allergen Reactions   • Augmentin [Amoxicillin-Pot Clavulanate] Diarrhea   • Keflex [Cephalexin] Rash       Objective    Objective     Vitals:   Temp:  [97.7 °F (36.5 °C)-98.3 °F (36.8 °C)] 97.7 °F (36.5 °C)  Heart Rate:  [52-76] 56  Resp:  [16-18] 16  BP: ()/(43-89) 96/44    Physical Exam      Result Review    Result Review:  I have personally reviewed the results from the time of this admission to 12/7/2021 09:05 EST and agree with these findings:  [x]  Laboratory  []  Microbiology  [x]  Radiology  [x]  EKG/Telemetry   []  Cardiology/Vascular   []  Pathology  []  Old records  []  Other:    WBC   Date Value Ref Range Status   12/07/2021 5.91 3.40 - 10.80 10*3/mm3 Final     RBC   Date Value Ref Range Status   12/07/2021 4.40 3.77 - 5.28 10*6/mm3 Final     Hemoglobin   Date Value Ref Range Status   12/07/2021 13.4 12.0 - 15.9 g/dL Final     Hematocrit   Date Value Ref Range Status   12/07/2021 39.7 34.0 - 46.6 % Final     MCV   Date Value Ref Range Status   12/07/2021 90.2 79.0 - 97.0 fL Final     MCH   Date Value Ref Range Status   12/07/2021 30.5 26.6 - 33.0 pg Final     MCHC   Date Value Ref Range Status   12/07/2021 33.8 31.5 - 35.7 g/dL Final     RDW   Date Value Ref Range Status   12/07/2021 12.6 12.3 - 15.4 % Final     RDW-SD   Date Value Ref Range Status   12/07/2021 41.4 37.0 - 54.0 fl Final     MPV   Date Value Ref Range Status   12/07/2021 11.5 6.0 - 12.0 fL Final     Platelets   Date Value Ref Range Status   12/07/2021 188 140 - 450 10*3/mm3 Final     Neutrophil %   Date Value Ref Range Status   12/06/2021 47.5 42.7 - 76.0 % Final     Lymphocyte %   Date Value Ref Range Status   12/06/2021 38.0 19.6 - 45.3 % Final     Monocyte %   Date Value Ref Range Status   12/06/2021 9.4 5.0 - 12.0 % Final     Eosinophil  %   Date Value Ref Range Status   12/06/2021 3.8 0.3 - 6.2 % Final     Basophil %   Date Value Ref Range Status   12/06/2021 1.3 0.0 - 1.5 % Final     Immature Grans %   Date Value Ref Range Status   12/06/2021 0.0 0.0 - 0.5 % Final     Neutrophils, Absolute   Date Value Ref Range Status   12/06/2021 2.23 1.70 - 7.00 10*3/mm3 Final     Lymphocytes, Absolute   Date Value Ref Range Status   12/06/2021 1.78 0.70 - 3.10 10*3/mm3 Final     Monocytes, Absolute   Date Value Ref Range Status   12/06/2021 0.44 0.10 - 0.90 10*3/mm3 Final     Eosinophils, Absolute   Date Value Ref Range Status   12/06/2021 0.18 0.00 - 0.40 10*3/mm3 Final     Basophils, Absolute   Date Value Ref Range Status   12/06/2021 0.06 0.00 - 0.20 10*3/mm3 Final     Immature Grans, Absolute   Date Value Ref Range Status   12/06/2021 0.00 0.00 - 0.05 10*3/mm3 Final     nRBC   Date Value Ref Range Status   12/06/2021 0.0 0.0 - 0.2 /100 WBC Final      Basic Metabolic Panel    Sodium Sodium   Date Value Ref Range Status   12/07/2021 137 136 - 145 mmol/L Final   12/06/2021 137 136 - 145 mmol/L Final      Potassium Potassium   Date Value Ref Range Status   12/07/2021 4.3 3.5 - 5.2 mmol/L Final     Comment:     Slight hemolysis detected by analyzer. Results may be affected.   12/06/2021 4.3 3.5 - 5.2 mmol/L Final      Chloride Chloride   Date Value Ref Range Status   12/07/2021 102 98 - 107 mmol/L Final   12/06/2021 101 98 - 107 mmol/L Final      Bicarbonate No results found for: PLASMABICARB   BUN BUN   Date Value Ref Range Status   12/07/2021 16 6 - 20 mg/dL Final   12/06/2021 14 6 - 20 mg/dL Final      Creatinine Creatinine   Date Value Ref Range Status   12/07/2021 0.91 0.57 - 1.00 mg/dL Final   12/06/2021 0.71 0.57 - 1.00 mg/dL Final      Calcium Calcium   Date Value Ref Range Status   12/07/2021 9.0 8.6 - 10.5 mg/dL Final   12/06/2021 9.5 8.6 - 10.5 mg/dL Final      Glucose      No components found for: GLUCOSE.*  Lab Results   Component Value Date     GLUCOSE 100 (H) 12/07/2021    BUN 16 12/07/2021    CREATININE 0.91 12/07/2021    EGFRIFNONA 65 12/07/2021    BCR 17.6 12/07/2021    K 4.3 12/07/2021    CO2 26.3 12/07/2021    CALCIUM 9.0 12/07/2021    ALBUMIN 4.20 12/06/2021    LABIL2 1.3 (L) 07/02/2019    AST 20 12/06/2021    ALT 13 12/06/2021      CARDIAC LABS:      Lab 12/07/21  0516 12/06/21  1953 12/06/21  1558 12/06/21  1055   PROBNP  --   --   --  51.1   TROPONIN T <0.010 <0.010 <0.010  --       TSH Results:       CPK    Common Labsle 12/6/21   Creatine Kinase 151            No results found for this or any previous visit.   No results found for: COVID19   EKG: normal EKG, normal sinus rhythm, unchanged from previous tracings, sinus bradycardia, incomplete right bundle branch block.  No change compared to previous EKG.   chest X-ray   CT-scan of the chest              Most notable findings include: Negative cardiac enzymes and unchanged EKG with atypical chest pain    Assessment/Plan   Assessment / Plan     Brief Patient Summary:  Terese Chun is a 51 y.o. female who admitted with atypical chest pain.  Risk factors include smoking history and strong family history for premature atherosclerosis    Active Hospital Problems:  Active Hospital Problems    Diagnosis    • Family history of early CAD    • Cigarette nicotine dependence with nicotine-induced disorder    • Chest pain      Plan: Proceed with nuclear stress test.  Smoking cessation Counseling was performed.  Patient is agreeable to stopping smoking      DVT prophylaxis:  Medical DVT prophylaxis orders are present.    CODE STATUS:    Code Status (Patient has no pulse and is not breathing): CPR (Attempt to Resuscitate)  Medical Interventions (Patient has pulse or is breathing): Full Support    Admission Status:  I believe this patient meets observation status.    Electronically signed by Jadiel Vasquez MD, 12/07/21, 8:57 AM EST.

## 2021-12-07 NOTE — DISCHARGE SUMMARY
[facility]         HOSPITALIST  DISCHARGE SUMMARY    Patient Name: Terese Chun  : 1970  MRN: [id number]    Date of Admission: 2021  Date of Discharge: 2021  Primary Care Physician: Fabricio Vang MD    Consults     Date and Time Order Name Status Description    2021  4:45 PM Inpatient Cardiology Consult            Active and Resolved Hospital Problems:  Active Hospital Problems    Diagnosis POA   • Family history of early CAD [Z82.49] Not Applicable   • Cigarette nicotine dependence with nicotine-induced disorder [F17.219] Unknown   • Chest pain [R07.9] Yes      Resolved Hospital Problems   No resolved problems to display.       Hospital Course      Hospital Course:  Terese Chun is a 51 y.o. female who presented with chief complaint of chest pain. Patient was kept n.p.o. and cardiology was consulted. It was recommended that patient undergo nuclear stress test. Patient ultimately had nuclear stress test that was read as normal myocardial perfusion study without inducible ischemia. Her ejection fraction was noted be estimated at 60%. Patient had her metoprolol titrates changed to metoprolol succinate and she was also given nitroglycerin patch to wear daily. Patient will follow up with cardiology as scheduled. She will follow-up with her PCP in 1 week. At the time of discharge patient had no chest pain, chest pressure, palpitations, fever, chills. She was discharged in stable condition.        DISCHARGE Follow Up Recommendations for labs and diagnostics: PCP 1 week. Cardiology as scheduled.      Day of Discharge     Vital Signs:  Temp:  [97.2 °F (36.2 °C)-97.9 °F (36.6 °C)] 97.2 °F (36.2 °C)  Heart Rate:  [53-76] 67  Resp:  [16] 16  BP: ()/(41-89) 89/41  Physical Exam:   Constitutional: Alert, awake, no acute distress  HEENT:  PERRLA, EOMI; No Scleral icterus  Neck:  Supple; No Mass, No Lymphadenopathy  Cardiovascular:  No Rubs, No Edema, No  JVD  Respiratory: No respiratory distress, unlabored breathing, saturating well on room air  Abdomen:  Normal BS all 4 Quadrants; No Guarding, No Tenderness  Musculoskeletal:  Pulses Positive all 4 Ext; No Cyanosis, No Edema  Neurological:  Alert+Ox3, Mental status WNL; No Dysarthria  Skin:  No Rash, No Cellulitis, No Erythema      Discharge Details        Discharge Medications      New Medications      Instructions Start Date   metoprolol succinate XL 25 MG 24 hr tablet  Commonly known as: TOPROL-XL   25 mg, Oral, Nightly      nitroglycerin 0.2 MG/HR patch  Commonly known as: NITRODUR   1 patch, Transdermal, Daily   Start Date: December 8, 2021        Continue These Medications      Instructions Start Date   amphetamine-dextroamphetamine 20 MG tablet  Commonly known as: Adderall   20 mg, Oral, 3 Times Daily      aspirin 81 MG EC tablet   81 mg, Oral, Daily      escitalopram 20 MG tablet  Commonly known as: LEXAPRO   20 mg, Oral, Daily      estradiol 0.025 MG/24HR patch  Commonly known as: VIVELLE-DOT   1 patch, Transdermal, 2 Times Weekly      meclizine 25 MG tablet  Commonly known as: ANTIVERT   25 mg, Oral, 3 Times Daily PRN         Stop These Medications    metoprolol tartrate 25 MG tablet  Commonly known as: LOPRESSOR            Allergies   Allergen Reactions   • Augmentin [Amoxicillin-Pot Clavulanate] Diarrhea   • Keflex [Cephalexin] Rash       Discharge Disposition:  Home or Self Care    Diet:  Hospital:  Diet Order   Procedures   • Diet Regular; Cardiac       Discharge Activity:       CODE STATUS:  Code Status and Medical Interventions:   Ordered at: 12/06/21 1703     Code Status (Patient has no pulse and is not breathing):    CPR (Attempt to Resuscitate)     Medical Interventions (Patient has pulse or is breathing):    Full Support         Future Appointments   Date Time Provider Department Center   1/13/2022 11:45 AM Jadiel Vasquez MD [facility] [place] [facility]   4/12/2022  2:30 PM Shari Mathur MD [facility] SLEEP CT  [facility]           Pertinent  and/or Most Recent Results     PROCEDURES:   XR Chest 1 View    Result Date: 12/6/2021  Narrative: PROCEDURE: XR CHEST 1 VW  COMPARISON: [facility], CR, CHEST AP/PA 1 VIEW, 7/02/2019, 13:41.  INDICATIONS: Chest Pain triage protocol  FINDINGS:  There is no pneumothorax, pleural effusion or focal airspace consolidation. The heart size and pulmonary vasculature appear within normal limits. There are no acute osseous abnormalities.  CONCLUSION: No acute cardiopulmonary abnormality.       ASHLEY WILLSON MD       Electronically Signed and Approved By: ASHLEY WILLSON MD on 12/06/2021 at 11:29             Stress Test With Myocardial Perfusion One Day    Result Date: 12/7/2021  Narrative: · Left ventricular ejection fraction is normal. (Calculated EF = 68%). · Myocardial perfusion imaging indicates a normal myocardial perfusion study with no evidence of ischemia. · Impressions are consistent with a low risk study. · Findings consistent with a normal ECG stress test.          LAB RESULTS:      Lab 12/07/21  0517 12/06/21  1055   WBC 5.91 4.69   HEMOGLOBIN 13.4 13.9   HEMATOCRIT 39.7 40.5   PLATELETS 188 223   NEUTROS ABS  --  2.23   IMMATURE GRANS (ABS)  --  0.00   LYMPHS ABS  --  1.78   MONOS ABS  --  0.44   EOS ABS  --  0.18   MCV 90.2 89.0         Lab 12/07/21  0517 12/07/21  0516 12/06/21  1055   SODIUM  --  137 137   POTASSIUM  --  4.3 4.3   CHLORIDE  --  102 101   CO2  --  26.3 28.0   ANION GAP  --  8.7 8.0   BUN  --  16 14   CREATININE  --  0.91 0.71   GLUCOSE  --  100* 96   CALCIUM  --  9.0 9.5   MAGNESIUM  --  1.8 1.8   HEMOGLOBIN A1C 5.20  --   --          Lab 12/06/21  1055   TOTAL PROTEIN 7.4   ALBUMIN 4.20   GLOBULIN 3.2   ALT (SGPT) 13   AST (SGOT) 20   BILIRUBIN 0.2   ALK PHOS 52   LIPASE 31         Lab 12/07/21  0516 12/06/21  1953 12/06/21  1558 12/06/21  1055   PROBNP  --   --   --  51.1   TROPONIN T <0.010 <0.010 <0.010  --          Lab 12/07/21  0516   CHOLESTEROL  202*   LDL CHOL 139*   HDL CHOL 44   TRIGLYCERIDES 106             Brief Urine Lab Results     None        Microbiology Results (last 10 days)     ** No results found for the last 240 hours. **                           Labs Pending at Discharge:        Time spent on Discharge including face to face service:  31 minutes    Electronically signed by Fabricio Tidwell DO, 12/07/21, 5:48 PM EST.

## 2021-12-08 ENCOUNTER — READMISSION MANAGEMENT (OUTPATIENT)
Dept: CALL CENTER | Facility: HOSPITAL | Age: 51
End: 2021-12-08

## 2021-12-08 NOTE — OUTREACH NOTE
Prep Survey      Responses   [facility] patient discharged from? [facility]   Is LACE score < 7 ? Yes   Emergency Room discharge w/ pulse ox? No   Eligibility Not Eligible   What are the reasons patient is not eligible? Other   Does the patient have one of the following disease processes/diagnoses(primary or secondary)? Other   Prep survey completed? Yes          Gillian Leonard RN

## 2021-12-10 NOTE — PROGRESS NOTES
[facility]    SLEEP CLINIC FOLLOW UP PROGRESS NOTE.    Terese Chun  1970  51 y.o.  female      PCP: Fabricio Vang MD      Date of visit: 10/12/2021    Patient is a 51 years old female who is returning for follow-up visit  Reason for follow-up visit: Idiopathic hypersomnia and medication refill and restless leg syndrome    HPI:  1.  Idiopathic hypersomnia.  Diagnosed with idiopathic hypersomnia on 11/23/2011 following a multiple sleep latency test done because of  history of excessive daytime sleepiness.  She had an average sleep latency of 3 minutes and 6 seconds out of 5 naps.  Currently being treated with Adderall 20 mg, 1 tablet twice a day. She developed a motor tic associated with Adderall however, these have gradually improved.  At the present time, she is taking Adderall 20 mg, 1 tablet twice a day without much problems.  Some other medications  that have been tried for her daytime sleepiness include Ritalin which produced some mood changes (irritable and angry) and Nuvigil which produced worsening headaches.  To date, she has had the best response with Adderall.    Patient goes to bed at around 10 PM and wakes up at around 6:00 in the morning getting approximately 7 to 8 hours of sleep during the night.    2.  Restless leg syndrome.  This is currently stable.  He takes Sinemet 25/100 when needed.  Her symptoms are intermittent.    Mediactions and allergies are reviewed by me and documented in the encounter.     SOCIAL ( habits pertaining to sleep medicine)  History of smoking:Yes e-cigarette  History of alcohol use: 1-2 per week  Caffeine use: 1 cup    REVIEW OF SYSTEMS:   [place] Sleepiness Scale :Total score: 16   Nsaal congestion: No  Dry mouth/nose: No  Post nasal drip; yes  Acid reflux/Heartburn: No  Abd bloating: No  Morining headache: No  Anxiety: No  Depression: No  Continued to experience intermittent  difficulty getting her words out.  She also  developed a transient  "episode where in her arms and legs got numb and tingly  Physical Exam :  CONSTITUTIONAL:  Vitals:    10/12/21 1500   BP: 120/68   Pulse: 83   Temp: 96 °F (35.6 °C)   SpO2: 100%   Weight: 70.3 kg (155 lb)   Height: 167.6 cm (66\")    Body mass index is 25.02 kg/m².   Neck: No masses noted.  No carotid bruits  RESP SYSTEM: Breath sounds are normal, no wheezes or crackles  CARDIOVASULAR: Heart rate is regular without murmur.   Neuropsych: She is awake alert and oriented.  Responses are coherent and relevant.  Mood and affect appeared appropriate.         ASSESSMENT AND PLAN:  · Idiopathic hypersomnia, stable on Adderall  · Restless leg syndrome, intermittent improved with medication      She is going to continue taking Adderall 20 mg 1 tablet twice a day  She is going to continue Sinemet 25/100 1 to 2 tablets as needed for restless leg syndrome  · Return in about 6 months (around 4/12/2022). . Patient's questions were answered.      Shari Mathur MD  12/9/2021               "

## 2021-12-13 DIAGNOSIS — G47.11 IDIOPATHIC HYPERSOMNIA: ICD-10-CM

## 2021-12-14 RX ORDER — DEXTROAMPHETAMINE SACCHARATE, AMPHETAMINE ASPARTATE, DEXTROAMPHETAMINE SULFATE AND AMPHETAMINE SULFATE 5; 5; 5; 5 MG/1; MG/1; MG/1; MG/1
20 TABLET ORAL 3 TIMES DAILY
Qty: 90 TABLET | Refills: 0 | Status: SHIPPED | OUTPATIENT
Start: 2021-12-14 | End: 2022-01-18 | Stop reason: SDUPTHER

## 2022-01-17 ENCOUNTER — OFFICE VISIT (OUTPATIENT)
Dept: CARDIOLOGY | Facility: CLINIC | Age: 52
End: 2022-01-17

## 2022-01-17 VITALS
BODY MASS INDEX: 25.99 KG/M2 | HEIGHT: 65 IN | WEIGHT: 156 LBS | SYSTOLIC BLOOD PRESSURE: 128 MMHG | DIASTOLIC BLOOD PRESSURE: 80 MMHG | HEART RATE: 86 BPM

## 2022-01-17 DIAGNOSIS — F17.219 CIGARETTE NICOTINE DEPENDENCE WITH NICOTINE-INDUCED DISORDER: ICD-10-CM

## 2022-01-17 DIAGNOSIS — E78.5 HYPERLIPIDEMIA LDL GOAL <100: ICD-10-CM

## 2022-01-17 DIAGNOSIS — R07.89 OTHER CHEST PAIN: Primary | ICD-10-CM

## 2022-01-17 PROCEDURE — 99214 OFFICE O/P EST MOD 30 MIN: CPT | Performed by: NURSE PRACTITIONER

## 2022-01-17 RX ORDER — NITROGLYCERIN 0.4 MG/1
TABLET SUBLINGUAL
Qty: 100 TABLET | Refills: 11 | Status: SHIPPED | OUTPATIENT
Start: 2022-01-17

## 2022-01-17 RX ORDER — METOPROLOL SUCCINATE 25 MG/1
25 TABLET, EXTENDED RELEASE ORAL NIGHTLY
Qty: 90 TABLET | Refills: 3 | Status: SHIPPED | OUTPATIENT
Start: 2022-01-17

## 2022-01-17 RX ORDER — ATORVASTATIN CALCIUM 20 MG/1
20 TABLET, FILM COATED ORAL DAILY
Qty: 90 TABLET | Refills: 3 | Status: SHIPPED | OUTPATIENT
Start: 2022-01-17

## 2022-01-17 NOTE — ASSESSMENT & PLAN NOTE
Stable even without a nitro patch.  (Intolerant due to headaches ) We will give her nitro tabs to use on a as needed basis.  Instructed to call if her chest pains increase and will consider diagnostic cardiac cath.

## 2022-01-17 NOTE — ASSESSMENT & PLAN NOTE
Lipids slightly high and in with her risk factors she needs to be on a statin.  Patient agrees.  So we will start on atorvastatin 20 mg a day.  Check lipid CMP in 3 months

## 2022-01-17 NOTE — PROGRESS NOTES
Chief Complaint  Chest Pain and Early family h/o of CAD    Subjective            Terese Shabana Chun presents to [facility] CARDIOLOG[facility]  She is a 51-year-old white female recently hospitalized for chest pains.  Admits that she had been without her metoprolol for a time before her hospitalization.  Stress test was normal and she was started on a nitro patch.  States she took the Nitropatch for 2 days but the headaches are unbearable.  So she stopped it.  She is not any significant chest discomfort without the Nitropatch.  Does have palpitation described a fluttery in sensation.  They are nonsustained.  Has occasional lightheaded for Ménière's disease. Denies  shortness of breath,  swelling,  syncope, PND, and orthopnea.  Continues to vape and has no desire to stop at this time.  But she is contemplating stopping it when she leaves her current position and starts school.  She had the [employer] COVID-vaccine but she is not had a booster yet.              Past History:    Past Medical History:   Diagnosis Date   • Chest pain    • Hypertension    • Idiopathic hypersomnia    • Meniere disease         Family History: family history includes Arrhythmia in her father; Cancer in her father; Heart disease in her mother.     Social History: reports that she has quit smoking. Her smoking use included cigarettes. She has never used smokeless tobacco. She reports current alcohol use. She reports that she does not use drugs.    Allergies: Augmentin [amoxicillin-pot clavulanate] and Keflex [cephalexin]      Past Surgical History:   Procedure Laterality Date   • HEMORRHOIDECTOMY     • HYSTERECTOMY            Current Outpatient Medications:   •  amphetamine-dextroamphetamine (Adderall) 20 MG tablet, Take 1 tablet by mouth 3 (Three) Times a Day., Disp: 90 tablet, Rfl: 0  •  aspirin 81 MG EC tablet, Take 81 mg by mouth Daily., Disp: , Rfl:   •  escitalopram (LEXAPRO) 20 MG tablet, Take 20 mg by mouth Daily.,  "Disp: , Rfl:   •  estradiol (VIVELLE-DOT) 0.025 MG/24HR patch, Place 1 patch on the skin as directed by provider 2 (Two) Times a Week., Disp: , Rfl:   •  meclizine (ANTIVERT) 25 MG tablet, Take 25 mg by mouth 3 (Three) Times a Day As Needed for Dizziness or Nausea., Disp: , Rfl:   •  metoprolol succinate XL (TOPROL-XL) 25 MG 24 hr tablet, Take 1 tablet by mouth Every Night., Disp: 90 tablet, Rfl: 3  •  atorvastatin (LIPITOR) 20 MG tablet, Take 1 tablet by mouth Daily., Disp: 90 tablet, Rfl: 3  •  nitroglycerin (NITROSTAT) 0.4 MG SL tablet, 1 under the tongue as needed for angina, may repeat q5mins for up three doses, Disp: 100 tablet, Rfl: 11     Medications Discontinued During This Encounter   Medication Reason   • nitroglycerin (NITRODUR) 0.2 MG/HR patch Side effects   • metoprolol succinate XL (TOPROL-XL) 25 MG 24 hr tablet Reorder        Review of Systems   Constitutional: Negative for fatigue.   Respiratory: Negative for cough and shortness of breath.    Cardiovascular: Positive for chest pain and palpitations. Negative for leg swelling.   Neurological: Positive for dizziness and light-headedness (Has menier's also).   All other systems reviewed and are negative.       Objective     Physical Exam  Constitutional:       General: She is not in acute distress.     Appearance: Normal appearance. She is normal weight.   Neck:      Vascular: No carotid bruit.   Cardiovascular:      Rate and Rhythm: Normal rate and regular rhythm.      Heart sounds: Normal heart sounds.   Pulmonary:      Effort: Pulmonary effort is normal.      Breath sounds: Normal breath sounds.   Musculoskeletal:      Right lower leg: No edema.      Left lower leg: No edema.   Neurological:      Mental Status: She is alert.       /80   Pulse 86   Ht 165.1 cm (65\")   Wt 70.8 kg (156 lb)   BMI 25.96 kg/m²       Vitals:    01/17/22 1125   BP: 128/80   Pulse: 86       Result Review :         The following data was reviewed by: Milagro Baker" ASIYA Sanchez on 01/17/2022:      Lab Results   Component Value Date    PROBNP 51.1 12/06/2021     CMP    CMP 12/6/21 12/7/21   Glucose 96 100 (A)   BUN 14 16   Creatinine 0.71 0.91   eGFR Non African Am 86 65   Sodium 137 137   Potassium 4.3 4.3   Chloride 101 102   Calcium 9.5 9.0   Albumin 4.20    Total Bilirubin 0.2    Alkaline Phosphatase 52    AST (SGOT) 20    ALT (SGPT) 13    (A) Abnormal value       Comments are available for some flowsheets but are not being displayed.           CBC w/diff    CBC w/Diff 12/6/21 12/7/21   WBC 4.69 5.91   RBC 4.55 4.40   Hemoglobin 13.9 13.4   Hematocrit 40.5 39.7   MCV 89.0 90.2   MCH 30.5 30.5   MCHC 34.3 33.8   RDW 12.6 12.6   Platelets 223 188   Neutrophil Rel % 47.5    Immature Granulocyte Rel % 0.0    Lymphocyte Rel % 38.0    Monocyte Rel % 9.4    Eosinophil Rel % 3.8    Basophil Rel % 1.3             Lipid Panel    Lipid Panel 12/7/21   Total Cholesterol 202 (A)   Triglycerides 106   HDL Cholesterol 44   VLDL Cholesterol 19   LDL Cholesterol  139 (A)   LDL/HDL Ratio 3.11   (A) Abnormal value             Lab Results   Component Value Date    TSH 2.370 01/25/2019      Lab Results   Component Value Date    FREET4 1.4 01/25/2019      No results found for: DDIMERQUANT  Magnesium   Date Value Ref Range Status   12/07/2021 1.8 1.6 - 2.6 mg/dL Final      No results found for: DIGOXIN   A1C Last 3 Results    HGBA1C Last 3 Results 12/7/21   Hemoglobin A1C 5.20                    Assessment and Plan        Diagnoses and all orders for this visit:    1. Other chest pain (Primary)  Assessment & Plan:  Stable even without a nitro patch.  (Intolerant due to headaches ) We will give her nitro tabs to use on a as needed basis.  Instructed to call if her chest pains increase and will consider diagnostic cardiac cath.    Orders:  -     nitroglycerin (NITROSTAT) 0.4 MG SL tablet; 1 under the tongue as needed for angina, may repeat q5mins for up three doses  Dispense: 100 tablet; Refill:  11  -     metoprolol succinate XL (TOPROL-XL) 25 MG 24 hr tablet; Take 1 tablet by mouth Every Night.  Dispense: 90 tablet; Refill: 3    2. Hyperlipidemia LDL goal <100  Assessment & Plan:  Lipids slightly high and in with her risk factors she needs to be on a statin.  Patient agrees.  So we will start on atorvastatin 20 mg a day.  Check lipid CMP in 3 months    Orders:  -     atorvastatin (LIPITOR) 20 MG tablet; Take 1 tablet by mouth Daily.  Dispense: 90 tablet; Refill: 3  -     Lipid Panel; Future  -     Comprehensive Metabolic Panel; Future    3. Cigarette nicotine dependence with nicotine-induced disorder  Assessment & Plan:  Counseled regarding the risk of continued vaping.  She is not ready to stop at this time.  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  She understands the risk of continued use.    4. Encouraged to get a COVID-booster soon as possible          Follow Up     Return in about 6 months (around 7/17/2022) for with Dr. Vasquez.    Patient was given instructions and counseling regarding her condition or for health maintenance advice. Please see specific information pulled into the AVS if appropriate.       Olivia Sanchez, ASIYA  01/17/22 11:30 EST

## 2022-01-17 NOTE — ASSESSMENT & PLAN NOTE
Counseled regarding the risk of continued vaping.  She is not ready to stop at this time.  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  She understands the risk of continued use.

## 2022-01-18 DIAGNOSIS — G47.11 IDIOPATHIC HYPERSOMNIA: ICD-10-CM

## 2022-01-18 RX ORDER — DEXTROAMPHETAMINE SACCHARATE, AMPHETAMINE ASPARTATE, DEXTROAMPHETAMINE SULFATE AND AMPHETAMINE SULFATE 5; 5; 5; 5 MG/1; MG/1; MG/1; MG/1
20 TABLET ORAL 3 TIMES DAILY
Qty: 90 TABLET | Refills: 0 | Status: SHIPPED | OUTPATIENT
Start: 2022-01-18 | End: 2022-03-08 | Stop reason: SDUPTHER

## 2022-03-08 DIAGNOSIS — G47.11 IDIOPATHIC HYPERSOMNIA: ICD-10-CM

## 2022-03-09 RX ORDER — DEXTROAMPHETAMINE SACCHARATE, AMPHETAMINE ASPARTATE, DEXTROAMPHETAMINE SULFATE AND AMPHETAMINE SULFATE 5; 5; 5; 5 MG/1; MG/1; MG/1; MG/1
20 TABLET ORAL 3 TIMES DAILY
Qty: 90 TABLET | Refills: 0 | Status: SHIPPED | OUTPATIENT
Start: 2022-03-09 | End: 2022-04-27 | Stop reason: SDUPTHER

## 2022-04-27 ENCOUNTER — OFFICE VISIT (OUTPATIENT)
Dept: SLEEP MEDICINE | Facility: HOSPITAL | Age: 52
End: 2022-04-27

## 2022-04-27 VITALS
HEIGHT: 65 IN | BODY MASS INDEX: 27.32 KG/M2 | SYSTOLIC BLOOD PRESSURE: 101 MMHG | HEART RATE: 83 BPM | TEMPERATURE: 97 F | WEIGHT: 164 LBS | OXYGEN SATURATION: 99 % | DIASTOLIC BLOOD PRESSURE: 69 MMHG

## 2022-04-27 DIAGNOSIS — G47.33 OSA (OBSTRUCTIVE SLEEP APNEA): Primary | ICD-10-CM

## 2022-04-27 DIAGNOSIS — G47.11 IDIOPATHIC HYPERSOMNIA: ICD-10-CM

## 2022-04-27 PROCEDURE — G0463 HOSPITAL OUTPT CLINIC VISIT: HCPCS | Performed by: PSYCHIATRY & NEUROLOGY

## 2022-04-27 RX ORDER — DEXTROAMPHETAMINE SACCHARATE, AMPHETAMINE ASPARTATE, DEXTROAMPHETAMINE SULFATE AND AMPHETAMINE SULFATE 5; 5; 5; 5 MG/1; MG/1; MG/1; MG/1
20 TABLET ORAL 3 TIMES DAILY
Qty: 90 TABLET | Refills: 0 | Status: SHIPPED | OUTPATIENT
Start: 2022-04-27 | End: 2022-06-08 | Stop reason: SDUPTHER

## 2022-05-27 NOTE — PROGRESS NOTES
Chief Complaint  Terese hCun is a 52 y.o. female who is returning for a follow-up visit.  Reason for follow-up visit: Idiopathic Hypersomnia, Restless Leg Syndrome, and medication follow-up and refill    Subjective            History of Present Illness  1.  Idiopathic Hypersomnia  She was diagnosed with idiopathic hypersomnia on 11/23/2011 after she had a sleep study done and a multiple sleep latency test because of history of excessive daytime sleepiness.  Her average sleep latency was 3 minutes and 6 seconds.  She is currently being treated with Adderall 20 mg 1 tablet up to 3 times  a day.  She has developed some motor tic associated with the Adderall (repeatedly touching the tip of her tongue to the upper teeth) however, these have gradually improved.  At the present time, she continues to take Adderall without any difficulties.  Other medications which have been tried for her daytime sleepiness include Ritalin with some side effects (mood changes such as irritability and feeling angry) and Nuvigil which produced worsening headaches.    The patient reports that she gets about 6 to 7 hours of sleep during the night.  Current bedtime is from 11 PM to 7 AM.  She wakes up 2 times to go to the bathroom however, is able to go back to sleep without any difficulties.    2.  Restless Leg Syndrome  She is also taking Sinemet 25/100 for restless leg symptoms on a as needed basis since her symptoms are intermittent.  She has no present complaints regarding this.    Review of Systems    Interval Past Medical and Surgical History:  Noncontributory    Past Medical History:   Diagnosis Date   • Chest pain    • Hypertension    • Idiopathic hypersomnia    • Meniere disease        Past Surgical History:   Procedure Laterality Date   • HEMORRHOIDECTOMY     • HYSTERECTOMY           Current Outpatient Medications:   •  amphetamine-dextroamphetamine (Adderall) 20 MG tablet, Take 1 tablet by mouth 3 (Three) Times a Day., Disp:  90 tablet, Rfl: 0  •  aspirin 81 MG EC tablet, Take 81 mg by mouth Daily., Disp: , Rfl:   •  atorvastatin (LIPITOR) 20 MG tablet, Take 1 tablet by mouth Daily., Disp: 90 tablet, Rfl: 3  •  escitalopram (LEXAPRO) 20 MG tablet, Take 20 mg by mouth Daily., Disp: , Rfl:   •  estradiol (VIVELLE-DOT) 0.025 MG/24HR patch, Place 1 patch on the skin as directed by provider 2 (Two) Times a Week., Disp: , Rfl:   •  meclizine (ANTIVERT) 25 MG tablet, Take 25 mg by mouth 3 (Three) Times a Day As Needed for Dizziness or Nausea., Disp: , Rfl:   •  metoprolol succinate XL (TOPROL-XL) 25 MG 24 hr tablet, Take 1 tablet by mouth Every Night., Disp: 90 tablet, Rfl: 3  •  nitroglycerin (NITROSTAT) 0.4 MG SL tablet, 1 under the tongue as needed for angina, may repeat q5mins for up three doses, Disp: 100 tablet, Rfl: 11     Allergies   Allergen Reactions   • Augmentin [Amoxicillin-Pot Clavulanate] Diarrhea   • Keflex [Cephalexin] Rash       Family History   Problem Relation Age of Onset   • Heart disease Mother    • Arrhythmia Father    • Cancer Father         Social History     Social History Narrative   • Not on file   She works dayshift.  She does not smoke.  Alcohol intake 1 drink per week.  Caffeine intake 2 cups of coffee per day.        Objective    Physical Exam  Vitals reviewed.   Constitutional:       Appearance: Normal appearance.   HENT:      Head: Normocephalic and atraumatic.   Cardiovascular:      Rate and Rhythm: Regular rhythm.      Heart sounds: Normal heart sounds.   Pulmonary:      Effort: Pulmonary effort is normal.      Breath sounds: Normal breath sounds.   Neurological:      Mental Status: She is alert and oriented to person, place, and time.   Psychiatric:         Mood and Affect: Mood normal.         Behavior: Behavior normal.         Thought Content: Thought content normal.         Vital Signs:  Vitals:    04/27/22 1100   BP: 101/69   Pulse: 83   Temp: 97 °F (36.1 °C)   SpO2: 99%   Weight: 74.4 kg (164 lb)  "  Height: 165.1 cm (65\")     Body mass index is 27.29 kg/m².     Result Review :        Assessment and Plan      Idiopathic Hypersomnia  Restless Leg Syndrome    Plans: She is going to continue taking Adderall 20 mg 1 tablet three times  a day              She is going to continue taking Sinemet 25/100 at night as needed for restless leg symptoms.    Xywav briefly discussed as indicated for idiopathic hypersomnia.  Follow Up     Return in about 6 months (around 10/27/2022).  Patient was given instructions and counseling regarding her condition or for health maintenance advice.  "

## 2022-06-08 DIAGNOSIS — G47.11 IDIOPATHIC HYPERSOMNIA: ICD-10-CM

## 2022-06-08 RX ORDER — DEXTROAMPHETAMINE SACCHARATE, AMPHETAMINE ASPARTATE, DEXTROAMPHETAMINE SULFATE AND AMPHETAMINE SULFATE 5; 5; 5; 5 MG/1; MG/1; MG/1; MG/1
20 TABLET ORAL 3 TIMES DAILY
Qty: 90 TABLET | Refills: 0 | Status: SHIPPED | OUTPATIENT
Start: 2022-06-08 | End: 2022-07-18 | Stop reason: SDUPTHER

## 2022-07-18 DIAGNOSIS — G47.11 IDIOPATHIC HYPERSOMNIA: ICD-10-CM

## 2022-07-19 RX ORDER — DEXTROAMPHETAMINE SACCHARATE, AMPHETAMINE ASPARTATE, DEXTROAMPHETAMINE SULFATE AND AMPHETAMINE SULFATE 5; 5; 5; 5 MG/1; MG/1; MG/1; MG/1
20 TABLET ORAL 3 TIMES DAILY
Qty: 90 TABLET | Refills: 0 | Status: SHIPPED | OUTPATIENT
Start: 2022-07-19 | End: 2022-09-16 | Stop reason: SDUPTHER

## 2022-09-16 DIAGNOSIS — G47.11 IDIOPATHIC HYPERSOMNIA: ICD-10-CM

## 2022-09-16 RX ORDER — DEXTROAMPHETAMINE SACCHARATE, AMPHETAMINE ASPARTATE, DEXTROAMPHETAMINE SULFATE AND AMPHETAMINE SULFATE 5; 5; 5; 5 MG/1; MG/1; MG/1; MG/1
20 TABLET ORAL 3 TIMES DAILY
Qty: 90 TABLET | Refills: 0 | Status: SHIPPED | OUTPATIENT
Start: 2022-09-16 | End: 2022-10-27 | Stop reason: SDUPTHER

## 2022-10-27 ENCOUNTER — OFFICE VISIT (OUTPATIENT)
Dept: SLEEP MEDICINE | Facility: HOSPITAL | Age: 52
End: 2022-10-27

## 2022-10-27 VITALS
DIASTOLIC BLOOD PRESSURE: 73 MMHG | WEIGHT: 143.8 LBS | HEART RATE: 66 BPM | SYSTOLIC BLOOD PRESSURE: 117 MMHG | BODY MASS INDEX: 23.11 KG/M2 | HEIGHT: 66 IN

## 2022-10-27 DIAGNOSIS — G47.11 IDIOPATHIC HYPERSOMNIA: Primary | ICD-10-CM

## 2022-10-27 PROCEDURE — 99214 OFFICE O/P EST MOD 30 MIN: CPT | Performed by: FAMILY MEDICINE

## 2022-10-27 PROCEDURE — G0463 HOSPITAL OUTPT CLINIC VISIT: HCPCS

## 2022-10-27 RX ORDER — DEXTROAMPHETAMINE SACCHARATE, AMPHETAMINE ASPARTATE, DEXTROAMPHETAMINE SULFATE AND AMPHETAMINE SULFATE 5; 5; 5; 5 MG/1; MG/1; MG/1; MG/1
20 TABLET ORAL 3 TIMES DAILY
Qty: 90 TABLET | Refills: 0 | Status: SHIPPED | OUTPATIENT
Start: 2022-10-27 | End: 2022-12-09 | Stop reason: SDUPTHER

## 2022-10-27 NOTE — PROGRESS NOTES
Follow Up Sleep Disorders Center Note     Chief Complaint: Idiopathic hypersomnia    Primary Care Physician: Fabricio Vang MD    Terese Chun is a 52 y.o.female  was last seen at [facility] sleep lab: 4/27/2022.  New patient to me.  Presents today for follow-up on idiopathic hypersomnia.  Sleep study in 2011 MSLT showed mean sleep latency 3 minutes 6 seconds.  At last visit being treated with Adderall 20 mg up to 3 times a day.  Developed motor tic associated with Adderall however those gradually improved (repeatedly touches the tip of her tongue to the upper teeth).  At last visit was doing well without any difficulties.  Had tried Ritalin in the past had side effects of mood changes and also tried Nuvigil which produced worsening headaches.  Patient is also on Sinemet 25/100 mg for restless leg syndrome.  Last refill for Adderall was September 16, 2022.    Today reports bedtime 12 PM wake time 8 AM.  Adderall continues to control symptoms well.  Needs refill today.  Was seen in the ER about a year ago for chest pain follows with cardiology on metoprolol.  No issues since then.  Blood pressure and heart rate normal.      Current Medications:    Current Outpatient Medications:   •  amphetamine-dextroamphetamine (Adderall) 20 MG tablet, Take 1 tablet by mouth 3 (Three) Times a Day., Disp: 90 tablet, Rfl: 0  •  aspirin 81 MG EC tablet, Take 81 mg by mouth Daily., Disp: , Rfl:   •  atorvastatin (LIPITOR) 20 MG tablet, Take 1 tablet by mouth Daily., Disp: 90 tablet, Rfl: 3  •  escitalopram (LEXAPRO) 20 MG tablet, Take 20 mg by mouth Daily., Disp: , Rfl:   •  estradiol (VIVELLE-DOT) 0.025 MG/24HR patch, Place 1 patch on the skin as directed by provider 2 (Two) Times a Week., Disp: , Rfl:   •  meclizine (ANTIVERT) 25 MG tablet, Take 25 mg by mouth 3 (Three) Times a Day As Needed for Dizziness or Nausea., Disp: , Rfl:   •  metoprolol succinate XL (TOPROL-XL) 25 MG 24 hr tablet, Take 1 tablet by mouth Every Night.,  "Disp: 90 tablet, Rfl: 3  •  nitroglycerin (NITROSTAT) 0.4 MG SL tablet, 1 under the tongue as needed for angina, may repeat q5mins for up three doses, Disp: 100 tablet, Rfl: 11   also entered in Sleep Questionnaire    Patient  has a past medical history of Chest pain, Hypertension, Idiopathic hypersomnia, and Meniere disease.    Social History:    Social History     Socioeconomic History   • Marital status:    Tobacco Use   • Smoking status: Former     Types: Cigarettes   • Smokeless tobacco: Never   Vaping Use   • Vaping Use: Every day   • Substances: Nicotine   Substance and Sexual Activity   • Alcohol use: Yes     Comment: occasional   • Drug use: Never   • Sexual activity: Defer       Allergies:  Augmentin [amoxicillin-pot clavulanate] and Keflex [cephalexin]    Vital Signs:    Vitals:    10/27/22 1453   BP: 117/73   Pulse: 66   Weight: 65.2 kg (143 lb 12.8 oz)   Height: 167.6 cm (66\")     Body mass index is 23.21 kg/m².    REVIEW OF SYSTEMS.  Full review of systems available on the intake form which is scanned in the media tab.  The relevant positive are noted below  1. Daytime excessive sleepiness with [place] Sleepiness Scale : 12  2. Negative      Physical exam:  Vitals:    10/27/22 1453   BP: 117/73   Pulse: 66   Weight: 65.2 kg (143 lb 12.8 oz)   Height: 167.6 cm (66\")    Body mass index is 23.21 kg/m².    HEENT: Head is atraumatic, normocephalic  Eyes: pupils are round equal and reacting to light and accommodation, conjunctiva normal  RESPIRATORY SYSTEM: Regular respirations  CARDIOVASULAR SYSTEM: Regular rate  EXTREMITES: No cyanosis, clubbing  NEUROLOGICAL SYSTEM: Oriented x 3, no gross motor defects, gait normal    Impression:  1. Idiopathic hypersomnia        Continue Adderall 20 mg up to 3 times daily.  Can call in between for refills.  Return to clinic in 6 months for follow-up or sooner if needed.    Caution during activities that require prolonged concentration is strongly advised if " sleepiness returns.     Marylou Brown MD  Sleep Medicine  10/27/22  15:01 EDT

## 2022-12-09 DIAGNOSIS — G47.11 IDIOPATHIC HYPERSOMNIA: ICD-10-CM

## 2022-12-09 RX ORDER — DEXTROAMPHETAMINE SACCHARATE, AMPHETAMINE ASPARTATE, DEXTROAMPHETAMINE SULFATE AND AMPHETAMINE SULFATE 5; 5; 5; 5 MG/1; MG/1; MG/1; MG/1
20 TABLET ORAL 3 TIMES DAILY
Qty: 90 TABLET | Refills: 0 | Status: SHIPPED | OUTPATIENT
Start: 2022-12-09 | End: 2023-01-30 | Stop reason: SDUPTHER

## 2022-12-16 ENCOUNTER — LAB (OUTPATIENT)
Dept: LAB | Facility: HOSPITAL | Age: 52
End: 2022-12-16

## 2022-12-16 DIAGNOSIS — E78.5 HYPERLIPIDEMIA LDL GOAL <100: ICD-10-CM

## 2022-12-16 PROCEDURE — 80061 LIPID PANEL: CPT

## 2022-12-16 PROCEDURE — 36415 COLL VENOUS BLD VENIPUNCTURE: CPT

## 2022-12-16 PROCEDURE — 80053 COMPREHEN METABOLIC PANEL: CPT

## 2022-12-17 LAB
ALBUMIN SERPL-MCNC: 4.4 G/DL (ref 3.5–5.2)
ALBUMIN/GLOB SERPL: 1.9 G/DL
ALP SERPL-CCNC: 49 U/L (ref 39–117)
ALT SERPL W P-5'-P-CCNC: 11 U/L (ref 1–33)
ANION GAP SERPL CALCULATED.3IONS-SCNC: 9.1 MMOL/L (ref 5–15)
AST SERPL-CCNC: 23 U/L (ref 1–32)
BILIRUB SERPL-MCNC: 0.3 MG/DL (ref 0–1.2)
BUN SERPL-MCNC: 13 MG/DL (ref 6–20)
BUN/CREAT SERPL: 15.7 (ref 7–25)
CALCIUM SPEC-SCNC: 9 MG/DL (ref 8.6–10.5)
CHLORIDE SERPL-SCNC: 102 MMOL/L (ref 98–107)
CHOLEST SERPL-MCNC: 135 MG/DL (ref 0–200)
CO2 SERPL-SCNC: 27.9 MMOL/L (ref 22–29)
CREAT SERPL-MCNC: 0.83 MG/DL (ref 0.57–1)
EGFRCR SERPLBLD CKD-EPI 2021: 83.9 ML/MIN/1.73
GLOBULIN UR ELPH-MCNC: 2.3 GM/DL
GLUCOSE SERPL-MCNC: 91 MG/DL (ref 65–99)
HDLC SERPL-MCNC: 44 MG/DL (ref 40–60)
LDLC SERPL CALC-MCNC: 79 MG/DL (ref 0–100)
LDLC/HDLC SERPL: 1.83 {RATIO}
POTASSIUM SERPL-SCNC: 4.2 MMOL/L (ref 3.5–5.2)
PROT SERPL-MCNC: 6.7 G/DL (ref 6–8.5)
SODIUM SERPL-SCNC: 139 MMOL/L (ref 136–145)
TRIGL SERPL-MCNC: 53 MG/DL (ref 0–150)
VLDLC SERPL-MCNC: 12 MG/DL (ref 5–40)

## 2023-01-30 DIAGNOSIS — G47.11 IDIOPATHIC HYPERSOMNIA: ICD-10-CM

## 2023-01-31 RX ORDER — DEXTROAMPHETAMINE SACCHARATE, AMPHETAMINE ASPARTATE, DEXTROAMPHETAMINE SULFATE AND AMPHETAMINE SULFATE 5; 5; 5; 5 MG/1; MG/1; MG/1; MG/1
20 TABLET ORAL 3 TIMES DAILY
Qty: 90 TABLET | Refills: 0 | Status: SHIPPED | OUTPATIENT
Start: 2023-01-31 | End: 2023-03-09 | Stop reason: SDUPTHER

## 2023-03-08 DIAGNOSIS — G47.11 IDIOPATHIC HYPERSOMNIA: ICD-10-CM

## 2023-03-08 RX ORDER — DEXTROAMPHETAMINE SACCHARATE, AMPHETAMINE ASPARTATE, DEXTROAMPHETAMINE SULFATE AND AMPHETAMINE SULFATE 5; 5; 5; 5 MG/1; MG/1; MG/1; MG/1
20 TABLET ORAL 3 TIMES DAILY
Qty: 90 TABLET | Refills: 0 | Status: CANCELLED | OUTPATIENT
Start: 2023-03-08

## 2023-03-09 DIAGNOSIS — G47.11 IDIOPATHIC HYPERSOMNIA: ICD-10-CM

## 2023-03-09 RX ORDER — DEXTROAMPHETAMINE SACCHARATE, AMPHETAMINE ASPARTATE, DEXTROAMPHETAMINE SULFATE AND AMPHETAMINE SULFATE 5; 5; 5; 5 MG/1; MG/1; MG/1; MG/1
20 TABLET ORAL 3 TIMES DAILY
Qty: 90 TABLET | Refills: 0 | Status: SHIPPED | OUTPATIENT
Start: 2023-03-09

## 2023-04-26 ENCOUNTER — OFFICE VISIT (OUTPATIENT)
Dept: SLEEP MEDICINE | Facility: HOSPITAL | Age: 53
End: 2023-04-26
Payer: COMMERCIAL

## 2023-04-26 VITALS
HEART RATE: 62 BPM | DIASTOLIC BLOOD PRESSURE: 65 MMHG | SYSTOLIC BLOOD PRESSURE: 115 MMHG | BODY MASS INDEX: 23.48 KG/M2 | HEIGHT: 66 IN | WEIGHT: 146.1 LBS | OXYGEN SATURATION: 100 %

## 2023-04-26 DIAGNOSIS — G47.11 IDIOPATHIC HYPERSOMNIA: Primary | ICD-10-CM

## 2023-04-26 PROCEDURE — G0463 HOSPITAL OUTPT CLINIC VISIT: HCPCS

## 2023-04-26 RX ORDER — DEXTROAMPHETAMINE SACCHARATE, AMPHETAMINE ASPARTATE, DEXTROAMPHETAMINE SULFATE AND AMPHETAMINE SULFATE 5; 5; 5; 5 MG/1; MG/1; MG/1; MG/1
20 TABLET ORAL 3 TIMES DAILY
Qty: 90 TABLET | Refills: 0 | Status: SHIPPED | OUTPATIENT
Start: 2023-04-26

## 2023-04-26 NOTE — PROGRESS NOTES
"  [address] [address]  Phone: [phone]  Fax: [phone]      SLEEP CLINIC FOLLOW UP PROGRESS NOTE.    Terese Chun  1970  53 y.o.  female      PCP: Fabricio Vang MD      Date of visit: 4/26/2023    Chief Complaint   Patient presents with   • Daytime Sleepiness       HPI:  This is a 53 y.o. years old patient is here for the management of idiopathic hypersomnia.  Previously she used to see Dr. Mathur then was followed by Dr. Brown.  She is on Adderall 20 mg 3 times a day.  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    Bedtime between 10 and 11 PM  Wake time between 6 and 7 AM  Number of times awakenings at night 1        Mediactions and allergies are reviewed by me and documented in the encounter.      Manuel report checked on PDMP and documented, no disturbance seen.      SOCIAL ( habits pertaining to sleep medicine)  History of smoking:Yes e-cigarette  History of alcohol use: <1 per week  Caffeine use: 2     REVIEW OF SYSTEMS:   [place] Sleepiness Scale :Total score: 13   Morining headache:No   Anxiety:No   Depression:No    PHYSICAL EXAMINATION:  CONSTITUTIONAL:  Vitals:    04/26/23 1500   BP: 115/65   Pulse: 62   SpO2: 100%   Weight: 66.3 kg (146 lb 1.6 oz)   Height: 167.6 cm (65.98\")    Body mass index is 23.59 kg/m².   NOSE: nasal passages are clear, no nasal polyps, septum in the midline.  THROAT: throat is clear,   RESP SYSTEM: Breath sounds are normal, no wheezes or crackles  CARDIOVASULAR: Heart rate is regular without murmur. No edema        ASSESSMENT AND PLAN:  · Idiopathic hypersomnia (G 47.12)  Patient is stable on current medications as mentioned above.  The Manuel report shows no disturbance.  Symptoms are well controlled.  Patient not experiencing any side effects. Continue same medications and prescription has been sent.  · Return in about 6 months (around 10/26/2023) for with " smart card down load dr Brown. . Patient's questions were answered.        Jose Andres MD  Sleep Medicine.  Medical Director, [facility], [facility]  4/26/2023 ,

## 2023-05-17 ENCOUNTER — TRANSCRIBE ORDERS (OUTPATIENT)
Dept: ADMINISTRATIVE | Facility: HOSPITAL | Age: 53
End: 2023-05-17
Payer: COMMERCIAL

## 2023-05-17 DIAGNOSIS — Z78.0 POST-MENOPAUSAL: Primary | ICD-10-CM

## 2023-05-17 DIAGNOSIS — Z12.31 SCREENING MAMMOGRAM FOR BREAST CANCER: ICD-10-CM

## 2023-06-07 ENCOUNTER — OFFICE VISIT (OUTPATIENT)
Dept: CARDIOLOGY | Facility: CLINIC | Age: 53
End: 2023-06-07
Payer: COMMERCIAL

## 2023-06-07 VITALS
HEIGHT: 65 IN | DIASTOLIC BLOOD PRESSURE: 68 MMHG | SYSTOLIC BLOOD PRESSURE: 101 MMHG | WEIGHT: 146.8 LBS | BODY MASS INDEX: 24.46 KG/M2 | HEART RATE: 63 BPM

## 2023-06-07 DIAGNOSIS — I10 HYPERTENSION, ESSENTIAL: ICD-10-CM

## 2023-06-07 DIAGNOSIS — R00.2 PALPITATIONS: Primary | ICD-10-CM

## 2023-06-07 DIAGNOSIS — E78.2 HYPERLIPEMIA, MIXED: ICD-10-CM

## 2023-06-07 PROCEDURE — 99214 OFFICE O/P EST MOD 30 MIN: CPT | Performed by: INTERNAL MEDICINE

## 2023-06-07 RX ORDER — RALOXIFENE HYDROCHLORIDE 60 MG/1
1 TABLET, FILM COATED ORAL DAILY
COMMUNITY
Start: 2023-05-16

## 2023-06-07 NOTE — PROGRESS NOTES
Chief Complaint  Hyperlipidemia and Chest Pain    Subjective            Terese Shabana Chun presents to [facility]  Hyperlipidemia  Pertinent negatives include no chest pain or shortness of breath.   Chest Pain   Associated symptoms include palpitations. Pertinent negatives include no shortness of breath.   Her past medical history is significant for hyperlipidemia.     Ms. Chun is here for follow-up evaluation management of atypical chest pain, hyperlipidemia, hypertension, palpitations.  She has not been seen in about a year and a half.  She has been doing well.  She quit smoking recently.  She has occasional palpitations when she is sleep deprived but otherwise has no complaints.    PMH  Past Medical History:   Diagnosis Date    Chest pain     Hypertension     Idiopathic hypersomnia     Meniere disease          SURGICALHX  Past Surgical History:   Procedure Laterality Date    HEMORRHOIDECTOMY      HYSTERECTOMY          SOC  Social History     Socioeconomic History    Marital status:    Tobacco Use    Smoking status: Former     Types: Cigarettes    Smokeless tobacco: Never   Vaping Use    Vaping Use: Every day    Substances: Nicotine   Substance and Sexual Activity    Alcohol use: Yes     Comment: occasional    Drug use: Never    Sexual activity: Defer         FAMHX  Family History   Problem Relation Age of Onset    Heart disease Mother     Arrhythmia Father     Cancer Father           ALLERGY  Allergies   Allergen Reactions    Augmentin [Amoxicillin-Pot Clavulanate] Diarrhea    Keflex [Cephalexin] Rash        MEDSCURRENT    Current Outpatient Medications:     amphetamine-dextroamphetamine (Adderall) 20 MG tablet, Take 1 tablet by mouth 3 (Three) Times a Day., Disp: 90 tablet, Rfl: 0    aspirin 81 MG EC tablet, Take 1 tablet by mouth Daily., Disp: , Rfl:     atorvastatin (LIPITOR) 20 MG tablet, Take 1 tablet by mouth Daily., Disp: 90 tablet, Rfl: 3    escitalopram (LEXAPRO)  "20 MG tablet, Take 1 tablet by mouth Daily., Disp: , Rfl:     meclizine (ANTIVERT) 25 MG tablet, Take 1 tablet by mouth 3 (Three) Times a Day As Needed for Dizziness or Nausea., Disp: , Rfl:     metoprolol succinate XL (TOPROL-XL) 25 MG 24 hr tablet, Take 1 tablet by mouth Every Night., Disp: 90 tablet, Rfl: 3    nitroglycerin (NITROSTAT) 0.4 MG SL tablet, 1 under the tongue as needed for angina, may repeat q5mins for up three doses (Patient taking differently: As Needed. 1 under the tongue as needed for angina, may repeat q5mins for up three doses), Disp: 100 tablet, Rfl: 11    raloxifene (EVISTA) 60 MG tablet, Take 1 tablet by mouth Daily., Disp: , Rfl:     estradiol (VIVELLE-DOT) 0.025 MG/24HR patch, Place 1 patch on the skin as directed by provider 2 (Two) Times a Week. (Patient not taking: Reported on 6/7/2023), Disp: , Rfl:       Review of Systems   Cardiovascular:  Positive for palpitations. Negative for chest pain.   Respiratory:  Negative for shortness of breath.       Objective     /68   Pulse 63   Ht 165.1 cm (65\")   Wt 66.6 kg (146 lb 12.8 oz)   BMI 24.43 kg/m²       General Appearance:   well developed  well nourished  HENT:   oropharynx moist  lips not cyanotic  Neck:  thyroid not enlarged  supple  Respiratory:  no respiratory distress  normal breath sounds  no rales  Cardiovascular:  no jugular venous distention  regular rhythm  apical impulse normal  S1 normal, S2 normal  no S3, no S4   no murmur  no rub, no thrill  carotid pulses normal; no bruit  pedal pulses normal  lower extremity edema: none    Musculoskeletal:  no clubbing of fingers.   normocephalic, head atraumatic  Skin:   warm, dry  Psychiatric:  judgement and insight appropriate  normal mood and affect      Result Review :     The following data was reviewed by: Ambrose Maradiaga MD on 06/07/2023:    CMP          12/16/2022    13:14   CMP   Glucose 91    BUN 13    Creatinine 0.83    EGFR 83.9    Sodium 139    Potassium " 4.2    Chloride 102    Calcium 9.0    Total Protein 6.7    Albumin 4.40    Globulin 2.3    Total Bilirubin 0.3    Alkaline Phosphatase 49    AST (SGOT) 23    ALT (SGPT) 11    Albumin/Globulin Ratio 1.9    BUN/Creatinine Ratio 15.7    Anion Gap 9.1        Lipid Panel          12/16/2022    13:14   Lipid Panel   Total Cholesterol 135    Triglycerides 53    HDL Cholesterol 44    VLDL Cholesterol 12    LDL Cholesterol  79    LDL/HDL Ratio 1.83               Procedures                  Assessment and Plan        ASSESSMENT:  Encounter Diagnoses   Name Primary?    Palpitations Yes    Hypertension, essential     Hyperlipemia, mixed          PLAN:    1.  Palpitations-these occur exclusively when she is sleep deprived.  Otherwise she is clinically stable with no other symptoms  2.  Hypertension, essential-stable, continue beta-blocker  3.  Mixed hyperlipidemia-significantly improved on statin therapy, continue    Follow-up with primary care for additional management, no additional cardiac follow-up is required at this time and she will be followed as needed          Patient was given instructions and counseling regarding her condition or for health maintenance advice. Please see specific information pulled into the AVS if appropriate.             KAILYN Maradiaga MD  6/7/2023    11:23 EDT

## 2023-08-04 ENCOUNTER — HOSPITAL ENCOUNTER (OUTPATIENT)
Dept: GENERAL RADIOLOGY | Facility: HOSPITAL | Age: 53
Discharge: HOME | End: 2023-08-04
Admitting: FAMILY MEDICINE
Payer: COMMERCIAL

## 2023-08-04 ENCOUNTER — TRANSCRIBE ORDERS (OUTPATIENT)
Dept: LAB | Facility: HOSPITAL | Age: 53
End: 2023-08-04
Payer: COMMERCIAL

## 2023-08-04 DIAGNOSIS — M79.672 LEFT FOOT PAIN: Primary | ICD-10-CM

## 2023-08-04 DIAGNOSIS — M79.672 LEFT FOOT PAIN: ICD-10-CM

## 2023-08-04 PROCEDURE — 73630 X-RAY EXAM OF FOOT: CPT

## 2023-08-29 DIAGNOSIS — G47.11 IDIOPATHIC HYPERSOMNIA: ICD-10-CM

## 2023-08-29 RX ORDER — DEXTROAMPHETAMINE SACCHARATE, AMPHETAMINE ASPARTATE, DEXTROAMPHETAMINE SULFATE AND AMPHETAMINE SULFATE 5; 5; 5; 5 MG/1; MG/1; MG/1; MG/1
20 TABLET ORAL 3 TIMES DAILY
Qty: 90 TABLET | Refills: 0 | Status: SHIPPED | OUTPATIENT
Start: 2023-08-29

## 2023-09-19 ENCOUNTER — HOSPITAL ENCOUNTER (OUTPATIENT)
Dept: MAMMOGRAPHY | Facility: HOSPITAL | Age: 53
Discharge: HOME | End: 2023-09-19
Payer: COMMERCIAL

## 2023-09-19 ENCOUNTER — HOSPITAL ENCOUNTER (OUTPATIENT)
Dept: BONE DENSITY | Facility: HOSPITAL | Age: 53
Discharge: HOME | End: 2023-09-19
Payer: COMMERCIAL

## 2023-09-19 DIAGNOSIS — Z12.31 SCREENING MAMMOGRAM FOR BREAST CANCER: ICD-10-CM

## 2023-09-19 DIAGNOSIS — Z78.0 POST-MENOPAUSAL: ICD-10-CM

## 2023-09-19 PROCEDURE — 77063 BREAST TOMOSYNTHESIS BI: CPT

## 2023-09-19 PROCEDURE — 77080 DXA BONE DENSITY AXIAL: CPT

## 2023-09-19 PROCEDURE — 77067 SCR MAMMO BI INCL CAD: CPT

## 2023-11-08 DIAGNOSIS — G47.11 IDIOPATHIC HYPERSOMNIA: ICD-10-CM

## 2023-11-08 RX ORDER — DEXTROAMPHETAMINE SACCHARATE, AMPHETAMINE ASPARTATE, DEXTROAMPHETAMINE SULFATE AND AMPHETAMINE SULFATE 5; 5; 5; 5 MG/1; MG/1; MG/1; MG/1
20 TABLET ORAL 3 TIMES DAILY
Qty: 90 TABLET | Refills: 0 | Status: SHIPPED | OUTPATIENT
Start: 2023-11-08

## 2023-12-11 ENCOUNTER — TELEPHONE (OUTPATIENT)
Dept: SLEEP MEDICINE | Facility: HOSPITAL | Age: 53
End: 2023-12-11
Payer: COMMERCIAL

## 2023-12-11 DIAGNOSIS — G47.11 IDIOPATHIC HYPERSOMNIA: ICD-10-CM

## 2023-12-11 RX ORDER — DEXTROAMPHETAMINE SACCHARATE, AMPHETAMINE ASPARTATE, DEXTROAMPHETAMINE SULFATE AND AMPHETAMINE SULFATE 5; 5; 5; 5 MG/1; MG/1; MG/1; MG/1
20 TABLET ORAL 3 TIMES DAILY
Qty: 90 TABLET | Refills: 0 | Status: SHIPPED | OUTPATIENT
Start: 2023-12-11

## 2023-12-12 ENCOUNTER — OFFICE VISIT (OUTPATIENT)
Dept: SLEEP MEDICINE | Facility: HOSPITAL | Age: 53
End: 2023-12-12
Payer: COMMERCIAL

## 2023-12-12 VITALS — WEIGHT: 152.7 LBS | BODY MASS INDEX: 24.54 KG/M2 | OXYGEN SATURATION: 99 % | HEART RATE: 82 BPM | HEIGHT: 66 IN

## 2023-12-12 DIAGNOSIS — G47.11 IDIOPATHIC HYPERSOMNIA: Primary | ICD-10-CM

## 2023-12-12 PROCEDURE — G0463 HOSPITAL OUTPT CLINIC VISIT: HCPCS

## 2023-12-12 RX ORDER — ESTRADIOL 0.1 MG/D
FILM, EXTENDED RELEASE TRANSDERMAL
COMMUNITY
Start: 2023-10-31

## 2023-12-12 RX ORDER — CYCLOBENZAPRINE HCL 10 MG
1 TABLET ORAL 3 TIMES DAILY
COMMUNITY
Start: 2023-11-09

## 2023-12-12 NOTE — PROGRESS NOTES
Follow Up [facility] Disorders [facility] Note     Chief Complaint: Idiopathic hypersomnia    Primary Care Physician: Fabricio Vang MD    Terese Chun is a 53 y.o.female  was last seen at [facility] sleep lab: 4/26/2023. Presents today for follow-up on idiopathic hypersomnia.  Sleep study in 2011 MSLT showed mean sleep latency 3 minutes 6 seconds.  Treated with Adderall 20 mg up to 3 times a day.  Developed motor tic associated with Adderall however those gradually improved (repeatedly touches the tip of her tongue to the upper teeth). Had tried Ritalin in the past had side effects of mood changes and also tried Nuvigil which produced worsening headaches.  Patient was also on Sinemet 25/100 mg for restless leg syndrome.  Patient reports she does not use this consistently anymore as se does not need it consistently.    12/12/2023: Today reports bedtime 10-11 PM wake time 7-8 AM.  Adderall continues to control symptoms well.  Denies chest pain shortness of breath headache with vision changes.  Adderall was last ordered yesterday for patient.  Patient will call for further refills.      Current Medications:    Current Outpatient Medications:     amphetamine-dextroamphetamine (Adderall) 20 MG tablet, Take 1 tablet by mouth 3 (Three) Times a Day., Disp: 90 tablet, Rfl: 0    aspirin 81 MG EC tablet, Take 1 tablet by mouth Daily., Disp: , Rfl:     atorvastatin (LIPITOR) 20 MG tablet, Take 1 tablet by mouth Daily., Disp: 90 tablet, Rfl: 3    cyclobenzaprine (FLEXERIL) 10 MG tablet, Take 1 tablet by mouth 3 times a day., Disp: , Rfl:     escitalopram (LEXAPRO) 20 MG tablet, Take 1 tablet by mouth Daily., Disp: , Rfl:     estradiol (VIVELLE-DOT) 0.1 MG/24HR patch, APPLY 1 PATCH TOPICALLY TO THE SKIN 2 TIMES A WEEK AS DIRECTED, Disp: , Rfl:     meclizine (ANTIVERT) 25 MG tablet, Take 1 tablet by mouth 3 (Three) Times a Day As Needed for Dizziness or Nausea., Disp: , Rfl:     metoprolol succinate XL (TOPROL-XL) 25 MG 24 hr  "tablet, Take 1 tablet by mouth Every Night., Disp: 90 tablet, Rfl: 3    nitroglycerin (NITROSTAT) 0.4 MG SL tablet, 1 under the tongue as needed for angina, may repeat q5mins for up three doses (Patient taking differently: As Needed. 1 under the tongue as needed for angina, may repeat q5mins for up three doses), Disp: 100 tablet, Rfl: 11    raloxifene (EVISTA) 60 MG tablet, Take 1 tablet by mouth Daily., Disp: , Rfl:     estradiol (VIVELLE-DOT) 0.025 MG/24HR patch, Place 1 patch on the skin as directed by provider 2 (Two) Times a Week. (Patient not taking: Reported on 6/7/2023), Disp: , Rfl:    also entered in Sleep Questionnaire    Patient  has a past medical history of Chest pain, Hypertension, Idiopathic hypersomnia, and Meniere disease.    Social History:    Social History     Socioeconomic History    Marital status:    Tobacco Use    Smoking status: Former     Types: Cigarettes    Smokeless tobacco: Never   Vaping Use    Vaping Use: Every day    Substances: Nicotine   Substance and Sexual Activity    Alcohol use: Yes     Comment: occasional    Drug use: Never    Sexual activity: Defer       Allergies:  Augmentin [amoxicillin-pot clavulanate] and Keflex [cephalexin]    Vital Signs:    Vitals:    12/12/23 0900   Pulse: 82   SpO2: 99%   Weight: 69.3 kg (152 lb 11.2 oz)   Height: 167.6 cm (65.98\")     Body mass index is 24.66 kg/m².    REVIEW OF SYSTEMS.  Full review of systems available on the intake form which is scanned in the media tab.  The relevant positive are noted below  Daytime excessive sleepiness with [place] Sleepiness Scale : 12  Negative      Physical exam:  Vitals:    12/12/23 0900   Pulse: 82   SpO2: 99%   Weight: 69.3 kg (152 lb 11.2 oz)   Height: 167.6 cm (65.98\")    Body mass index is 24.66 kg/m².    HEENT: Head is atraumatic, normocephalic  Eyes: pupils are round equal and reacting to light and accommodation, conjunctiva normal  RESPIRATORY SYSTEM: Regular respirations  CARDIOVASULAR " SYSTEM: Regular rate  EXTREMITES: No cyanosis, clubbing  NEUROLOGICAL SYSTEM: Oriented x 3, no gross motor defects, gait normal    Impression:  1. Idiopathic hypersomnia          Continue Adderall 20 mg up to 3 times daily.  Can call in between for refills.  Return to clinic in 6 months for follow-up or sooner if needed.    Caution during activities that require prolonged concentration is strongly advised if sleepiness returns.     Marylou Brown MD  Sleep Medicine  12/12/23  10:11 EST

## 2024-01-11 DIAGNOSIS — G47.11 IDIOPATHIC HYPERSOMNIA: ICD-10-CM

## 2024-01-12 RX ORDER — DEXTROAMPHETAMINE SACCHARATE, AMPHETAMINE ASPARTATE, DEXTROAMPHETAMINE SULFATE AND AMPHETAMINE SULFATE 5; 5; 5; 5 MG/1; MG/1; MG/1; MG/1
20 TABLET ORAL 3 TIMES DAILY
Qty: 90 TABLET | Refills: 0 | Status: SHIPPED | OUTPATIENT
Start: 2024-01-12

## 2024-02-09 DIAGNOSIS — G47.11 IDIOPATHIC HYPERSOMNIA: ICD-10-CM

## 2024-02-12 RX ORDER — DEXTROAMPHETAMINE SACCHARATE, AMPHETAMINE ASPARTATE, DEXTROAMPHETAMINE SULFATE AND AMPHETAMINE SULFATE 5; 5; 5; 5 MG/1; MG/1; MG/1; MG/1
20 TABLET ORAL 3 TIMES DAILY
Qty: 90 TABLET | Refills: 0 | Status: SHIPPED | OUTPATIENT
Start: 2024-02-12

## 2024-04-05 DIAGNOSIS — G47.11 IDIOPATHIC HYPERSOMNIA: ICD-10-CM

## 2024-04-05 RX ORDER — DEXTROAMPHETAMINE SACCHARATE, AMPHETAMINE ASPARTATE, DEXTROAMPHETAMINE SULFATE AND AMPHETAMINE SULFATE 5; 5; 5; 5 MG/1; MG/1; MG/1; MG/1
20 TABLET ORAL 3 TIMES DAILY
Qty: 90 TABLET | Refills: 0 | OUTPATIENT
Start: 2024-04-05

## 2024-04-10 DIAGNOSIS — G47.11 IDIOPATHIC HYPERSOMNIA: ICD-10-CM

## 2024-04-10 RX ORDER — DEXTROAMPHETAMINE SACCHARATE, AMPHETAMINE ASPARTATE, DEXTROAMPHETAMINE SULFATE AND AMPHETAMINE SULFATE 5; 5; 5; 5 MG/1; MG/1; MG/1; MG/1
20 TABLET ORAL 3 TIMES DAILY
Qty: 90 TABLET | Refills: 0 | Status: SHIPPED | OUTPATIENT
Start: 2024-04-10

## 2024-05-21 ENCOUNTER — OFFICE VISIT (OUTPATIENT)
Dept: SLEEP MEDICINE | Facility: HOSPITAL | Age: 54
End: 2024-05-21
Payer: COMMERCIAL

## 2024-05-21 VITALS — HEART RATE: 70 BPM | HEIGHT: 66 IN | WEIGHT: 155.5 LBS | OXYGEN SATURATION: 100 % | BODY MASS INDEX: 24.99 KG/M2

## 2024-05-21 DIAGNOSIS — G47.11 IDIOPATHIC HYPERSOMNIA: ICD-10-CM

## 2024-05-21 PROCEDURE — 99214 OFFICE O/P EST MOD 30 MIN: CPT | Performed by: FAMILY MEDICINE

## 2024-05-21 PROCEDURE — G0463 HOSPITAL OUTPT CLINIC VISIT: HCPCS

## 2024-05-21 RX ORDER — DEXTROAMPHETAMINE SACCHARATE, AMPHETAMINE ASPARTATE, DEXTROAMPHETAMINE SULFATE AND AMPHETAMINE SULFATE 5; 5; 5; 5 MG/1; MG/1; MG/1; MG/1
20 TABLET ORAL 3 TIMES DAILY
Qty: 90 TABLET | Refills: 0 | Status: SHIPPED | OUTPATIENT
Start: 2024-05-21

## 2024-05-21 RX ORDER — BUPROPION HYDROCHLORIDE 150 MG/1
1 TABLET, EXTENDED RELEASE ORAL EVERY 12 HOURS SCHEDULED
COMMUNITY
Start: 2024-05-07

## 2024-05-21 NOTE — PROGRESS NOTES
Follow Up Sleep Disorders Center Note     Chief Complaint: Idiopathic hypersomnia    Primary Care Physician: Fabricio Vang MD    Terese Chun is a 54 y.o.female  was last seen at [facility] sleep lab: 4/26/2023. Presents today for follow-up on idiopathic hypersomnia.  Sleep study in 2011 MSLT showed mean sleep latency 3 minutes 6 seconds.  Treated with Adderall 20 mg up to 3 times a day.  Developed motor tic associated with Adderall however those gradually improved (repeatedly touches the tip of her tongue to the upper teeth). Had tried Ritalin in the past had side effects of mood changes and also tried Nuvigil which produced worsening headaches.  Patient was also on Sinemet 25/100 mg for restless leg syndrome.  Patient reports she does not use this consistently anymore as se does not need it consistently.    12/12/2023: Today reports bedtime 10-11 PM wake time 7-8 AM.  Adderall continues to control symptoms well.  Denies chest pain shortness of breath headache with vision changes.  Adderall was last ordered yesterday for patient.  Patient will call for further refills.    5/21/24: Presents today for 6-month follow-up.  Adderall continues to control symptoms well.  Denies chest pain shortness of breath headache or vision changes.  Due for refill today.  Manuel reviewed.      Current Medications:    Current Outpatient Medications:     amphetamine-dextroamphetamine (Adderall) 20 MG tablet, Take 1 tablet by mouth 3 (Three) Times a Day., Disp: 90 tablet, Rfl: 0    aspirin 81 MG EC tablet, Take 1 tablet by mouth Daily., Disp: , Rfl:     atorvastatin (LIPITOR) 20 MG tablet, Take 1 tablet by mouth Daily., Disp: 90 tablet, Rfl: 3    buPROPion SR (WELLBUTRIN SR) 150 MG 12 hr tablet, Take 1 tablet by mouth Every 12 (Twelve) Hours., Disp: , Rfl:     cyclobenzaprine (FLEXERIL) 10 MG tablet, Take 1 tablet by mouth 3 times a day., Disp: , Rfl:     escitalopram (LEXAPRO) 20 MG tablet, Take 1 tablet by mouth Daily., Disp: ,  "Rfl:     estradiol (VIVELLE-DOT) 0.025 MG/24HR patch, Place 1 patch on the skin as directed by provider 2 (Two) Times a Week., Disp: , Rfl:     estradiol (VIVELLE-DOT) 0.1 MG/24HR patch, APPLY 1 PATCH TOPICALLY TO THE SKIN 2 TIMES A WEEK AS DIRECTED, Disp: , Rfl:     meclizine (ANTIVERT) 25 MG tablet, Take 1 tablet by mouth 3 (Three) Times a Day As Needed for Dizziness or Nausea., Disp: , Rfl:     metoprolol succinate XL (TOPROL-XL) 25 MG 24 hr tablet, Take 1 tablet by mouth Every Night., Disp: 90 tablet, Rfl: 3    nitroglycerin (NITROSTAT) 0.4 MG SL tablet, 1 under the tongue as needed for angina, may repeat q5mins for up three doses (Patient taking differently: As Needed. 1 under the tongue as needed for angina, may repeat q5mins for up three doses), Disp: 100 tablet, Rfl: 11   also entered in Sleep Questionnaire    Patient  has a past medical history of Chest pain, Hypertension, Idiopathic hypersomnia, and Meniere disease.    Social History:    Social History     Socioeconomic History    Marital status:    Tobacco Use    Smoking status: Former     Types: Cigarettes    Smokeless tobacco: Never   Vaping Use    Vaping status: Former    Substances: Nicotine    Devices: Disposable   Substance and Sexual Activity    Alcohol use: Yes     Comment: occasional    Drug use: Never    Sexual activity: Defer       Allergies:  Augmentin [amoxicillin-pot clavulanate] and Keflex [cephalexin]    Vital Signs:    Vitals:    05/21/24 0700   Pulse: 70   SpO2: 100%   Weight: 70.5 kg (155 lb 8 oz)   Height: 167.6 cm (65.98\")     Body mass index is 25.11 kg/m².    REVIEW OF SYSTEMS.  Full review of systems available on the intake form which is scanned in the media tab.  The relevant positive are noted below  Daytime excessive sleepiness with [place] Sleepiness Scale : 12  Negative      Physical exam:  Vitals:    05/21/24 0700   Pulse: 70   SpO2: 100%   Weight: 70.5 kg (155 lb 8 oz)   Height: 167.6 cm (65.98\")    Body mass index is " 25.11 kg/m².    HEENT: Head is atraumatic, normocephalic  Eyes: pupils are round equal and reacting to light and accommodation, conjunctiva normal  RESPIRATORY SYSTEM: Regular respirations  CARDIOVASULAR SYSTEM: Regular rate  EXTREMITES: No cyanosis, clubbing  NEUROLOGICAL SYSTEM: Oriented x 3, no gross motor defects, gait normal    Impression:  1. Idiopathic hypersomnia          Continue Adderall 20 mg up to 3 times daily.  Can call in between for refills.  Return to clinic in 6 months for follow-up or sooner if needed.    Caution during activities that require prolonged concentration is strongly advised if sleepiness returns.     Marylou Brown MD  Sleep Medicine  05/21/24  09:25 EDT

## 2024-07-03 DIAGNOSIS — G47.11 IDIOPATHIC HYPERSOMNIA: ICD-10-CM

## 2024-07-03 RX ORDER — DEXTROAMPHETAMINE SACCHARATE, AMPHETAMINE ASPARTATE, DEXTROAMPHETAMINE SULFATE AND AMPHETAMINE SULFATE 5; 5; 5; 5 MG/1; MG/1; MG/1; MG/1
20 TABLET ORAL 3 TIMES DAILY
Qty: 90 TABLET | Refills: 0 | Status: CANCELLED | OUTPATIENT
Start: 2024-07-03

## 2024-07-03 RX ORDER — DEXTROAMPHETAMINE SACCHARATE, AMPHETAMINE ASPARTATE, DEXTROAMPHETAMINE SULFATE AND AMPHETAMINE SULFATE 5; 5; 5; 5 MG/1; MG/1; MG/1; MG/1
20 TABLET ORAL 3 TIMES DAILY
Qty: 90 TABLET | Refills: 0 | Status: SHIPPED | OUTPATIENT
Start: 2024-07-03

## 2024-08-05 DIAGNOSIS — G47.11 IDIOPATHIC HYPERSOMNIA: ICD-10-CM

## 2024-08-06 RX ORDER — DEXTROAMPHETAMINE SACCHARATE, AMPHETAMINE ASPARTATE, DEXTROAMPHETAMINE SULFATE AND AMPHETAMINE SULFATE 5; 5; 5; 5 MG/1; MG/1; MG/1; MG/1
20 TABLET ORAL 3 TIMES DAILY
Qty: 90 TABLET | Refills: 0 | Status: SHIPPED | OUTPATIENT
Start: 2024-08-06

## 2024-09-24 DIAGNOSIS — G47.11 IDIOPATHIC HYPERSOMNIA: ICD-10-CM

## 2024-09-25 RX ORDER — DEXTROAMPHETAMINE SACCHARATE, AMPHETAMINE ASPARTATE, DEXTROAMPHETAMINE SULFATE AND AMPHETAMINE SULFATE 5; 5; 5; 5 MG/1; MG/1; MG/1; MG/1
20 TABLET ORAL 3 TIMES DAILY
Qty: 90 TABLET | Refills: 0 | Status: SHIPPED | OUTPATIENT
Start: 2024-09-25

## 2024-10-28 DIAGNOSIS — G47.11 IDIOPATHIC HYPERSOMNIA: ICD-10-CM

## 2024-10-28 RX ORDER — DEXTROAMPHETAMINE SACCHARATE, AMPHETAMINE ASPARTATE, DEXTROAMPHETAMINE SULFATE AND AMPHETAMINE SULFATE 5; 5; 5; 5 MG/1; MG/1; MG/1; MG/1
20 TABLET ORAL 3 TIMES DAILY
Qty: 90 TABLET | Refills: 0 | Status: SHIPPED | OUTPATIENT
Start: 2024-10-28

## 2024-11-21 ENCOUNTER — TELEMEDICINE (OUTPATIENT)
Dept: SLEEP MEDICINE | Facility: HOSPITAL | Age: 54
End: 2024-11-21
Payer: COMMERCIAL

## 2024-11-21 ENCOUNTER — TELEPHONE (OUTPATIENT)
Dept: SLEEP MEDICINE | Facility: HOSPITAL | Age: 54
End: 2024-11-21
Payer: COMMERCIAL

## 2024-11-21 ENCOUNTER — TELEPHONE (OUTPATIENT)
Dept: SLEEP MEDICINE | Facility: HOSPITAL | Age: 54
End: 2024-11-21

## 2024-11-21 DIAGNOSIS — G47.11 IDIOPATHIC HYPERSOMNIA: Primary | ICD-10-CM

## 2024-11-21 PROCEDURE — 99214 OFFICE O/P EST MOD 30 MIN: CPT | Performed by: FAMILY MEDICINE

## 2024-11-21 RX ORDER — DEXTROAMPHETAMINE SACCHARATE, AMPHETAMINE ASPARTATE, DEXTROAMPHETAMINE SULFATE AND AMPHETAMINE SULFATE 5; 5; 5; 5 MG/1; MG/1; MG/1; MG/1
20 TABLET ORAL 3 TIMES DAILY
Qty: 90 TABLET | Refills: 0 | Status: SHIPPED | OUTPATIENT
Start: 2024-11-25

## 2024-11-21 NOTE — TELEPHONE ENCOUNTER
Gave patient a call regarding missed Televisit appointment today(11/21/24)  @ 9:15. LVM asking patient If she would like to reschedule. She will be due for refill on Adderall 11/28/24 and Dr. Brown will not be able to refill without a follow up appointment.

## 2024-11-21 NOTE — PROGRESS NOTES
Follow Up Sleep Disorders Center Note     The provider is located in [place] using a secure MyChart Video Visit through [employer] Patient is being seen remotely via telehealth at their home address in [place], and stated that they are in a secure environment for this session. The patient's condition being diagnosed/treated is appropriate for telemedicine. The provider has identified herself as well as her credentials. The patient and/or patient's guardian, consent to be seen remotely, and when consent is given they understand that the consent allows for patient identifiable information to be sent to a third party as needed. They may refuse to be seen remotely at any time. The electronic data is encrypted and password protected, and the patient and/or guardian has been advised of the potential risks to privacy not withstanding such measures.   PT identifiers used: Name and .     You have chosen to receive care through a telehealth visit. Do you consent to use a video/audio connection for your medical care today? Yes.    Chief Complaint: Idiopathic hypersomnia    Primary Care Physician: Fabricio Vang MD    Terese Chun is a 54 y.o.female  was last seen at [facility] sleep lab: 6 months ago.  Presents today for follow-up on idiopathic hypersomnia.  Sleep study in  MSLT showed mean sleep latency 3 minutes 6 seconds.  Treated with Adderall 20 mg up to 3 times a day.  Developed motor tic associated with Adderall however those gradually improved (repeatedly touches the tip of her tongue to the upper teeth). Had tried Ritalin in the past had side effects of mood changes and also tried Nuvigil which produced worsening headaches.  Patient was also on Sinemet 25/100 mg for restless leg syndrome.  Patient reports she does not use this consistently anymore as se does not need it consistently.    2023: Today reports bedtime 10-11 PM wake time 7-8 AM.  Adderall continues to control symptoms well.  Denies chest pain shortness  of breath headache with vision changes.  Adderall was last ordered yesterday for patient.  Patient will call for further refills.    5/21/24: Presents today for 6-month follow-up.  Adderall continues to control symptoms well.  Denies chest pain shortness of breath headache or vision changes.  Due for refill today.  Manuel reviewed.    11/21/2024: Presents today for 6-month follow-up.  Patient is on Adderall 20 mg up to 3 times daily.  Last dispensed 10/28/2024.  Manuel reviewed.  Denies chest pain palpitation shortness of breath headache vision changes.  Continues to control symptoms well.      Current Medications:    Current Outpatient Medications:     [START ON 11/25/2024] amphetamine-dextroamphetamine (Adderall) 20 MG tablet, Take 1 tablet by mouth 3 (Three) Times a Day., Disp: 90 tablet, Rfl: 0    aspirin 81 MG EC tablet, Take 1 tablet by mouth Daily., Disp: , Rfl:     atorvastatin (LIPITOR) 20 MG tablet, Take 1 tablet by mouth Daily., Disp: 90 tablet, Rfl: 3    cyclobenzaprine (FLEXERIL) 10 MG tablet, Take 1 tablet by mouth 3 times a day., Disp: , Rfl:     escitalopram (LEXAPRO) 20 MG tablet, Take 1 tablet by mouth Daily., Disp: , Rfl:     estradiol (VIVELLE-DOT) 0.025 MG/24HR patch, Place 1 patch on the skin as directed by provider 2 (Two) Times a Week., Disp: , Rfl:     estradiol (VIVELLE-DOT) 0.1 MG/24HR patch, APPLY 1 PATCH TOPICALLY TO THE SKIN 2 TIMES A WEEK AS DIRECTED, Disp: , Rfl:     meclizine (ANTIVERT) 25 MG tablet, Take 1 tablet by mouth 3 (Three) Times a Day As Needed for Dizziness or Nausea., Disp: , Rfl:     metoprolol succinate XL (TOPROL-XL) 25 MG 24 hr tablet, Take 1 tablet by mouth Every Night., Disp: 90 tablet, Rfl: 3   also entered in Sleep Questionnaire    Patient  has a past medical history of Chest pain, Hypertension, Idiopathic hypersomnia, and Meniere disease.    Social History:    Social History     Socioeconomic History    Marital status:    Tobacco Use    Smoking status:  Former     Types: Cigarettes    Smokeless tobacco: Never   Vaping Use    Vaping status: Former    Substances: Nicotine    Devices: Disposable   Substance and Sexual Activity    Alcohol use: Yes     Comment: occasional    Drug use: Never    Sexual activity: Defer       Allergies:  Augmentin [amoxicillin-pot clavulanate] and Keflex [cephalexin]    REVIEW OF SYSTEMS.  Full review of systems available on the intake form which is scanned in the media tab.  The relevant positive are noted below  Daytime excessive sleepiness with [place] Sleepiness Scale : 12  Negative      Impression:  1. Idiopathic hypersomnia            Continue Adderall 20 mg up to 3 times daily.  Can call in between for refills.  Order placed today dated for 11/25/2024 for when she is due.  Return to clinic in 6 months for follow-up or sooner if needed.    Caution during activities that require prolonged concentration is strongly advised if sleepiness returns.     Marylou Brown MD  Sleep Medicine  11/21/24  11:21 EST

## 2024-12-12 ENCOUNTER — TRANSCRIBE ORDERS (OUTPATIENT)
Dept: ADMINISTRATIVE | Facility: HOSPITAL | Age: 54
End: 2024-12-12
Payer: COMMERCIAL

## 2024-12-12 DIAGNOSIS — Z12.31 VISIT FOR SCREENING MAMMOGRAM: Primary | ICD-10-CM

## 2025-01-03 DIAGNOSIS — G47.11 IDIOPATHIC HYPERSOMNIA: ICD-10-CM

## 2025-01-03 RX ORDER — DEXTROAMPHETAMINE SACCHARATE, AMPHETAMINE ASPARTATE, DEXTROAMPHETAMINE SULFATE AND AMPHETAMINE SULFATE 5; 5; 5; 5 MG/1; MG/1; MG/1; MG/1
20 TABLET ORAL 3 TIMES DAILY
Qty: 90 TABLET | Refills: 0 | Status: SHIPPED | OUTPATIENT
Start: 2025-01-03

## 2025-02-28 DIAGNOSIS — G47.11 IDIOPATHIC HYPERSOMNIA: ICD-10-CM

## 2025-03-03 RX ORDER — DEXTROAMPHETAMINE SACCHARATE, AMPHETAMINE ASPARTATE, DEXTROAMPHETAMINE SULFATE AND AMPHETAMINE SULFATE 5; 5; 5; 5 MG/1; MG/1; MG/1; MG/1
20 TABLET ORAL 3 TIMES DAILY
Qty: 90 TABLET | Refills: 0 | Status: SHIPPED | OUTPATIENT
Start: 2025-03-03

## 2025-04-18 DIAGNOSIS — G47.11 IDIOPATHIC HYPERSOMNIA: ICD-10-CM

## 2025-04-18 RX ORDER — DEXTROAMPHETAMINE SACCHARATE, AMPHETAMINE ASPARTATE, DEXTROAMPHETAMINE SULFATE AND AMPHETAMINE SULFATE 5; 5; 5; 5 MG/1; MG/1; MG/1; MG/1
20 TABLET ORAL 3 TIMES DAILY
Qty: 90 TABLET | Refills: 0 | Status: SHIPPED | OUTPATIENT
Start: 2025-04-18

## 2025-06-13 DIAGNOSIS — G47.11 IDIOPATHIC HYPERSOMNIA: ICD-10-CM

## 2025-06-13 RX ORDER — DEXTROAMPHETAMINE SACCHARATE, AMPHETAMINE ASPARTATE, DEXTROAMPHETAMINE SULFATE AND AMPHETAMINE SULFATE 5; 5; 5; 5 MG/1; MG/1; MG/1; MG/1
20 TABLET ORAL 3 TIMES DAILY
Qty: 90 TABLET | Refills: 0 | Status: SHIPPED | OUTPATIENT
Start: 2025-06-13

## 2025-08-22 DIAGNOSIS — G47.11 IDIOPATHIC HYPERSOMNIA: ICD-10-CM

## 2025-08-22 RX ORDER — DEXTROAMPHETAMINE SACCHARATE, AMPHETAMINE ASPARTATE, DEXTROAMPHETAMINE SULFATE AND AMPHETAMINE SULFATE 5; 5; 5; 5 MG/1; MG/1; MG/1; MG/1
20 TABLET ORAL 3 TIMES DAILY
Qty: 90 TABLET | Refills: 0 | Status: CANCELLED | OUTPATIENT
Start: 2025-08-22

## 2025-08-25 ENCOUNTER — OFFICE VISIT (OUTPATIENT)
Dept: SLEEP MEDICINE | Facility: HOSPITAL | Age: 55
End: 2025-08-25
Payer: COMMERCIAL

## 2025-08-25 VITALS
OXYGEN SATURATION: 100 % | HEART RATE: 67 BPM | SYSTOLIC BLOOD PRESSURE: 103 MMHG | DIASTOLIC BLOOD PRESSURE: 49 MMHG | WEIGHT: 145 LBS | BODY MASS INDEX: 23.3 KG/M2 | HEIGHT: 66 IN

## 2025-08-25 DIAGNOSIS — G47.11 IDIOPATHIC HYPERSOMNIA: ICD-10-CM

## 2025-08-25 PROCEDURE — 99213 OFFICE O/P EST LOW 20 MIN: CPT | Performed by: STUDENT IN AN ORGANIZED HEALTH CARE EDUCATION/TRAINING PROGRAM

## 2025-08-25 PROCEDURE — G0463 HOSPITAL OUTPT CLINIC VISIT: HCPCS

## 2025-08-25 RX ORDER — DEXTROAMPHETAMINE SACCHARATE, AMPHETAMINE ASPARTATE, DEXTROAMPHETAMINE SULFATE AND AMPHETAMINE SULFATE 5; 5; 5; 5 MG/1; MG/1; MG/1; MG/1
20 TABLET ORAL 3 TIMES DAILY
Qty: 90 TABLET | Refills: 0 | Status: SHIPPED | OUTPATIENT
Start: 2025-08-25